# Patient Record
Sex: FEMALE | Race: WHITE | NOT HISPANIC OR LATINO | Employment: UNEMPLOYED | URBAN - NONMETROPOLITAN AREA
[De-identification: names, ages, dates, MRNs, and addresses within clinical notes are randomized per-mention and may not be internally consistent; named-entity substitution may affect disease eponyms.]

---

## 2019-12-18 ENCOUNTER — APPOINTMENT (OUTPATIENT)
Dept: LAB | Facility: HOSPITAL | Age: 24
End: 2019-12-18

## 2019-12-18 ENCOUNTER — INITIAL PRENATAL (OUTPATIENT)
Dept: OBSTETRICS AND GYNECOLOGY | Facility: CLINIC | Age: 24
End: 2019-12-18

## 2019-12-18 VITALS
DIASTOLIC BLOOD PRESSURE: 78 MMHG | WEIGHT: 169.2 LBS | HEIGHT: 62 IN | BODY MASS INDEX: 31.14 KG/M2 | SYSTOLIC BLOOD PRESSURE: 115 MMHG

## 2019-12-18 DIAGNOSIS — Z32.01 POSITIVE PREGNANCY TEST: ICD-10-CM

## 2019-12-18 DIAGNOSIS — Z12.4 CERVICAL CANCER SCREENING: ICD-10-CM

## 2019-12-18 DIAGNOSIS — Z86.32 HISTORY OF GESTATIONAL DIABETES IN PRIOR PREGNANCY, CURRENTLY PREGNANT IN FIRST TRIMESTER: ICD-10-CM

## 2019-12-18 DIAGNOSIS — Z64.1 MULTIGRAVIDA: ICD-10-CM

## 2019-12-18 DIAGNOSIS — O09.291 HISTORY OF GESTATIONAL DIABETES IN PRIOR PREGNANCY, CURRENTLY PREGNANT IN FIRST TRIMESTER: ICD-10-CM

## 2019-12-18 DIAGNOSIS — N91.2 AMENORRHEA: ICD-10-CM

## 2019-12-18 DIAGNOSIS — O09.291 HISTORY OF PRE-ECLAMPSIA IN PRIOR PREGNANCY, CURRENTLY PREGNANT IN FIRST TRIMESTER: ICD-10-CM

## 2019-12-18 DIAGNOSIS — Z32.01 PREGNANCY EXAMINATION OR TEST, POSITIVE RESULT: Primary | ICD-10-CM

## 2019-12-18 DIAGNOSIS — O09.291 HISTORY OF POSTPARTUM HEMORRHAGE, CURRENTLY PREGNANT, FIRST TRIMESTER: ICD-10-CM

## 2019-12-18 LAB
ABO GROUP BLD: NORMAL
ALBUMIN SERPL-MCNC: 4.2 G/DL (ref 3.5–5.2)
ALBUMIN/GLOB SERPL: 1.5 G/DL
ALP SERPL-CCNC: 81 U/L (ref 39–117)
ALT SERPL W P-5'-P-CCNC: 36 U/L (ref 1–33)
AMPHET+METHAMPHET UR QL: NEGATIVE
AMPHETAMINES UR QL: NEGATIVE
ANION GAP SERPL CALCULATED.3IONS-SCNC: 15 MMOL/L (ref 5–15)
AST SERPL-CCNC: 23 U/L (ref 1–32)
B-HCG UR QL: POSITIVE
BACTERIA UR QL AUTO: ABNORMAL /HPF
BARBITURATES UR QL SCN: NEGATIVE
BASOPHILS # BLD AUTO: 0.04 10*3/MM3 (ref 0–0.2)
BASOPHILS NFR BLD AUTO: 0.6 % (ref 0–1.5)
BENZODIAZ UR QL SCN: NEGATIVE
BILIRUB SERPL-MCNC: 0.4 MG/DL (ref 0.2–1.2)
BILIRUB UR QL STRIP: NEGATIVE
BLD GP AB SCN SERPL QL: NEGATIVE
BUN BLD-MCNC: 9 MG/DL (ref 6–20)
BUN/CREAT SERPL: 14.5 (ref 7–25)
BUPRENORPHINE SERPL-MCNC: NEGATIVE NG/ML
CALCIUM SPEC-SCNC: 9.1 MG/DL (ref 8.6–10.5)
CANNABINOIDS SERPL QL: NEGATIVE
CHLORIDE SERPL-SCNC: 100 MMOL/L (ref 98–107)
CLARITY UR: ABNORMAL
CO2 SERPL-SCNC: 22 MMOL/L (ref 22–29)
COCAINE UR QL: NEGATIVE
COLOR UR: ABNORMAL
CREAT BLD-MCNC: 0.62 MG/DL (ref 0.57–1)
DEPRECATED RDW RBC AUTO: 41.2 FL (ref 37–54)
EOSINOPHIL # BLD AUTO: 0.13 10*3/MM3 (ref 0–0.4)
EOSINOPHIL NFR BLD AUTO: 2 % (ref 0.3–6.2)
ERYTHROCYTE [DISTWIDTH] IN BLOOD BY AUTOMATED COUNT: 13.7 % (ref 12.3–15.4)
GFR SERPL CREATININE-BSD FRML MDRD: 116 ML/MIN/1.73
GLOBULIN UR ELPH-MCNC: 2.8 GM/DL
GLUCOSE BLD-MCNC: 92 MG/DL (ref 65–99)
GLUCOSE UR STRIP-MCNC: NEGATIVE MG/DL
HBV SURFACE AG SERPL QL IA: NORMAL
HCT VFR BLD AUTO: 37.1 % (ref 34–46.6)
HCV AB SER DONR QL: NORMAL
HGB BLD-MCNC: 12.9 G/DL (ref 12–15.9)
HGB UR QL STRIP.AUTO: NEGATIVE
HIV1+2 AB SER QL: NORMAL
HYALINE CASTS UR QL AUTO: ABNORMAL /LPF
IMM GRANULOCYTES # BLD AUTO: 0.01 10*3/MM3 (ref 0–0.05)
IMM GRANULOCYTES NFR BLD AUTO: 0.2 % (ref 0–0.5)
INTERNAL NEGATIVE CONTROL: NEGATIVE
INTERNAL POSITIVE CONTROL: POSITIVE
KETONES UR QL STRIP: ABNORMAL
LEUKOCYTE ESTERASE UR QL STRIP.AUTO: NEGATIVE
LYMPHOCYTES # BLD AUTO: 1.48 10*3/MM3 (ref 0.7–3.1)
LYMPHOCYTES NFR BLD AUTO: 23 % (ref 19.6–45.3)
Lab: ABNORMAL
Lab: NORMAL
MCH RBC QN AUTO: 29 PG (ref 26.6–33)
MCHC RBC AUTO-ENTMCNC: 34.8 G/DL (ref 31.5–35.7)
MCV RBC AUTO: 83.4 FL (ref 79–97)
METHADONE UR QL SCN: NEGATIVE
MONOCYTES # BLD AUTO: 0.37 10*3/MM3 (ref 0.1–0.9)
MONOCYTES NFR BLD AUTO: 5.8 % (ref 5–12)
NEUTROPHILS # BLD AUTO: 4.4 10*3/MM3 (ref 1.7–7)
NEUTROPHILS NFR BLD AUTO: 68.4 % (ref 42.7–76)
NITRITE UR QL STRIP: NEGATIVE
NRBC BLD AUTO-RTO: 0 /100 WBC (ref 0–0.2)
OPIATES UR QL: NEGATIVE
OXYCODONE UR QL SCN: NEGATIVE
PCP UR QL SCN: NEGATIVE
PH UR STRIP.AUTO: 6 [PH] (ref 5–8)
PLATELET # BLD AUTO: 385 10*3/MM3 (ref 140–450)
PMV BLD AUTO: 10.7 FL (ref 6–12)
POTASSIUM BLD-SCNC: 3.8 MMOL/L (ref 3.5–5.2)
PROPOXYPH UR QL: NEGATIVE
PROT SERPL-MCNC: 7 G/DL (ref 6–8.5)
PROT UR QL STRIP: ABNORMAL
RBC # BLD AUTO: 4.45 10*6/MM3 (ref 3.77–5.28)
RBC # UR: ABNORMAL /HPF
REF LAB TEST METHOD: ABNORMAL
RH BLD: POSITIVE
SODIUM BLD-SCNC: 137 MMOL/L (ref 136–145)
SP GR UR STRIP: >=1.03 (ref 1–1.03)
SQUAMOUS #/AREA URNS HPF: ABNORMAL /HPF
TRICYCLICS UR QL SCN: NEGATIVE
UROBILINOGEN UR QL STRIP: ABNORMAL
WBC NRBC COR # BLD: 6.43 10*3/MM3 (ref 3.4–10.8)
WBC UR QL AUTO: ABNORMAL /HPF

## 2019-12-18 PROCEDURE — 87624 HPV HI-RISK TYP POOLED RSLT: CPT | Performed by: NURSE PRACTITIONER

## 2019-12-18 PROCEDURE — 80307 DRUG TEST PRSMV CHEM ANLYZR: CPT | Performed by: NURSE PRACTITIONER

## 2019-12-18 PROCEDURE — 36415 COLL VENOUS BLD VENIPUNCTURE: CPT | Performed by: NURSE PRACTITIONER

## 2019-12-18 PROCEDURE — 81025 URINE PREGNANCY TEST: CPT | Performed by: NURSE PRACTITIONER

## 2019-12-18 PROCEDURE — 0501F PRENATAL FLOW SHEET: CPT | Performed by: NURSE PRACTITIONER

## 2019-12-18 PROCEDURE — 88141 CYTOPATH C/V INTERPRET: CPT | Performed by: PATHOLOGY

## 2019-12-18 PROCEDURE — 80081 OBSTETRIC PANEL INC HIV TSTG: CPT | Performed by: NURSE PRACTITIONER

## 2019-12-18 PROCEDURE — 81001 URINALYSIS AUTO W/SCOPE: CPT | Performed by: NURSE PRACTITIONER

## 2019-12-18 PROCEDURE — 87086 URINE CULTURE/COLONY COUNT: CPT | Performed by: NURSE PRACTITIONER

## 2019-12-18 PROCEDURE — 86803 HEPATITIS C AB TEST: CPT | Performed by: NURSE PRACTITIONER

## 2019-12-18 PROCEDURE — 88142 CYTOPATH C/V THIN LAYER: CPT | Performed by: NURSE PRACTITIONER

## 2019-12-18 PROCEDURE — 80053 COMPREHEN METABOLIC PANEL: CPT | Performed by: NURSE PRACTITIONER

## 2019-12-18 RX ORDER — PRENATAL VIT NO.126/IRON/FOLIC 28MG-0.8MG
TABLET ORAL DAILY
COMMUNITY

## 2019-12-18 NOTE — PROGRESS NOTES
"CC: Initial Prenatal visit    Della Redmond is a 24 y.o.  presents to establish prenatal care.  She denies any current medical issues.  She was induced with both of her previous pregnancies for pre-eclampsia.  Soon after delivery of first child had post partum hemorrhage which required exploratory laporotomy in the OR.  Due to history of hemorrhage patient prefer primary  section.  Her second pregnancy was complicated by GDM and JUSTUS requiring Fe infusions.  She is adopted and familiar with much family history.  Patient declines NIPS.      Past Surgical History:   Procedure Laterality Date   • EXPLORATORY LAPAROTOMY      Hemorage after deliver     Social History     Socioeconomic History   • Marital status:      Spouse name: Not on file   • Number of children: Not on file   • Years of education: Not on file   • Highest education level: Not on file     OB History    Para Term  AB Living   3 2 1 1   2   SAB TAB Ectopic Molar Multiple Live Births             2      # Outcome Date GA Lbr Alexi/2nd Weight Sex Delivery Anes PTL Lv   3 Current            2 Term 14 37w0d  4309 g (9 lb 8 oz) F Vag-Spont EPI  LISETTE      Birth Comments: GDM, PreE   1  01/08/10 36w0d  2920 g (6 lb 7 oz) F Vag-Spont EPI  LISETTE      Birth Comments: IOL PreE, PP hemorrhage         /78   Ht 157.5 cm (62\")   Wt 76.7 kg (169 lb 3.2 oz)   LMP 10/15/2019   BMI 30.95 kg/m²        Fetal Heart Rate: +HHUS     ROS: Pt denies cramping, dysuria, or vaginal bleeding.      See NOB physical in episode, PE non-remarkable, pap smear collected     Problems (from 19 to present)     Problem Noted Resolved    History of pre-eclampsia in prior pregnancy, currently pregnant in first trimester 2019 by Amy Lomax APRN No          A/P: Della Redmond is a 24 y.o.  at Unknown.  First trimester teaching done  NOB labs done  - RTC in 2 weeks for CHARLY appt and dating scan or " sooner if needed      Diagnosis Plan   1. Pregnancy examination or test, positive result  OB Panel With HIV    Urinalysis With Microscopic - Urine, Clean Catch    Urine Culture - Urine, Urine, Clean Catch    Urine Drug Screen - Urine, Clean Catch    US Ob Transvaginal    RPR    CBC Auto Differential    Hepatitis B Surface Antigen    Rubella Antibody, IgG    OB Panel Type & Screen    HIV-1 / O / 2 Ag / Antibody 4th Generation    Hepatitis C Antibody    Urinalysis without microscopic (no culture) - Urine, Clean Catch    Urinalysis, Microscopic Only - Urine, Clean Catch    PREVIOUS HISTORY    PREVIOUS HISTORY   2. Amenorrhea  POC Pregnancy, Urine   3. Positive pregnancy test     4. Cervical cancer screening  Liquid-based Pap Smear, Screening   5. History of pre-eclampsia in prior pregnancy, currently pregnant in first trimester  Comprehensive Metabolic Panel    Protein, Urine, 24 Hour - Urine, Clean Catch   6. Multigravida     7. History of postpartum hemorrhage, currently pregnant, first trimester  Pt desires PCS   8. History of gestational diabetes in prior pregnancy, currently pregnant in first trimester  Glucose, Post 50 Gm Glucola       EDWIGE Erickson  12/18/2019  12:40 PM

## 2019-12-19 LAB
BACTERIA SPEC AEROBE CULT: NO GROWTH
RPR SER QL: NORMAL
RUBV IGG SERPL IA-ACNC: NORMAL

## 2019-12-20 LAB
GEN CATEG CVX/VAG CYTO-IMP: ABNORMAL
LAB AP CASE REPORT: ABNORMAL
LAB AP GYN ADDITIONAL INFORMATION: ABNORMAL
PATH INTERP SPEC-IMP: ABNORMAL
STAT OF ADQ CVX/VAG CYTO-IMP: ABNORMAL

## 2019-12-27 LAB — HPV I/H RISK 4 DNA CVX QL PROBE+SIG AMP: NEGATIVE

## 2020-01-03 ENCOUNTER — ROUTINE PRENATAL (OUTPATIENT)
Dept: OBSTETRICS AND GYNECOLOGY | Facility: CLINIC | Age: 25
End: 2020-01-03

## 2020-01-03 ENCOUNTER — LAB (OUTPATIENT)
Dept: LAB | Facility: HOSPITAL | Age: 25
End: 2020-01-03

## 2020-01-03 VITALS — BODY MASS INDEX: 31.28 KG/M2 | DIASTOLIC BLOOD PRESSURE: 80 MMHG | WEIGHT: 171 LBS | SYSTOLIC BLOOD PRESSURE: 128 MMHG

## 2020-01-03 DIAGNOSIS — O09.291 HISTORY OF PRE-ECLAMPSIA IN PRIOR PREGNANCY, CURRENTLY PREGNANT IN FIRST TRIMESTER: ICD-10-CM

## 2020-01-03 DIAGNOSIS — Z3A.11 11 WEEKS GESTATION OF PREGNANCY: Primary | ICD-10-CM

## 2020-01-03 DIAGNOSIS — O09.291 HISTORY OF GESTATIONAL DIABETES IN PRIOR PREGNANCY, CURRENTLY PREGNANT IN FIRST TRIMESTER: ICD-10-CM

## 2020-01-03 DIAGNOSIS — Z64.1 MULTIGRAVIDA: ICD-10-CM

## 2020-01-03 DIAGNOSIS — O09.291 HISTORY OF POSTPARTUM HEMORRHAGE, CURRENTLY PREGNANT, FIRST TRIMESTER: ICD-10-CM

## 2020-01-03 DIAGNOSIS — Z86.32 HISTORY OF GESTATIONAL DIABETES IN PRIOR PREGNANCY, CURRENTLY PREGNANT IN FIRST TRIMESTER: ICD-10-CM

## 2020-01-03 LAB
COLLECT DURATION TIME UR: 24 HRS
PROT 24H UR-MRATE: 33.3 MG/24HOURS (ref 0–150)
PROT UR-MCNC: 7 MG/DL
SPECIMEN VOL 24H UR: 475 ML

## 2020-01-03 PROCEDURE — 0502F SUBSEQUENT PRENATAL CARE: CPT | Performed by: NURSE PRACTITIONER

## 2020-01-03 PROCEDURE — 84156 ASSAY OF PROTEIN URINE: CPT

## 2020-01-03 PROCEDURE — 81050 URINALYSIS VOLUME MEASURE: CPT

## 2020-01-03 NOTE — PROGRESS NOTES
CC: Prenatal visit    Della Redmond is a 24 y.o.  at 11w3d.  Doing well.  No complaints.  Denies cramping, dysuria, or VB.      /80   Wt 77.6 kg (171 lb)   LMP 10/15/2019   BMI 31.28 kg/m²        Fetal Heart Rate: 160 us     Reviewed prelim scan report with pt- single live intrauterine pregnancy, CRL c/w LMP, CL 3.7 cm, uterus wnl, bilateral ovaries wnl     Problems (from 19 to present)     Problem Noted Resolved    History of pre-eclampsia in prior pregnancy, currently pregnant in first trimester 2019 by Amy Lomax APRN No          A/P: Della Redmond is a 24 y.o.  at 11w3d.  - RTC in 4 weeks for CHARLY appt      Diagnosis Plan   1. 11 weeks gestation of pregnancy     2. History of pre-eclampsia in prior pregnancy, currently pregnant in first trimester     3. History of gestational diabetes in prior pregnancy, currently pregnant in first trimester     4. Multigravida     5. History of postpartum hemorrhage, currently pregnant, first trimester         EDWIGE Erickson  1/3/2020  12:14 PM

## 2020-01-14 ENCOUNTER — LAB (OUTPATIENT)
Dept: LAB | Facility: HOSPITAL | Age: 25
End: 2020-01-14

## 2020-01-14 DIAGNOSIS — Z36.87 UNSURE OF LMP (LAST MENSTRUAL PERIOD) AS REASON FOR ULTRASOUND SCAN: Primary | ICD-10-CM

## 2020-01-14 DIAGNOSIS — Z86.32 HISTORY OF GESTATIONAL DIABETES IN PRIOR PREGNANCY, CURRENTLY PREGNANT IN FIRST TRIMESTER: ICD-10-CM

## 2020-01-14 DIAGNOSIS — Z3A.11 11 WEEKS GESTATION OF PREGNANCY: ICD-10-CM

## 2020-01-14 DIAGNOSIS — O09.291 HISTORY OF GESTATIONAL DIABETES IN PRIOR PREGNANCY, CURRENTLY PREGNANT IN FIRST TRIMESTER: ICD-10-CM

## 2020-01-14 LAB — GLUCOSE 1H P 100 G GLC PO SERPL-MCNC: 79 MG/DL (ref 60–140)

## 2020-01-14 PROCEDURE — 82950 GLUCOSE TEST: CPT

## 2020-01-14 PROCEDURE — 36415 COLL VENOUS BLD VENIPUNCTURE: CPT

## 2020-01-15 DIAGNOSIS — Z36.87 UNSURE OF LMP (LAST MENSTRUAL PERIOD) AS REASON FOR ULTRASOUND SCAN: ICD-10-CM

## 2020-01-15 DIAGNOSIS — Z3A.11 11 WEEKS GESTATION OF PREGNANCY: ICD-10-CM

## 2020-01-23 ENCOUNTER — ROUTINE PRENATAL (OUTPATIENT)
Dept: OBSTETRICS AND GYNECOLOGY | Facility: CLINIC | Age: 25
End: 2020-01-23

## 2020-01-23 VITALS — DIASTOLIC BLOOD PRESSURE: 76 MMHG | WEIGHT: 170.6 LBS | BODY MASS INDEX: 31.2 KG/M2 | SYSTOLIC BLOOD PRESSURE: 118 MMHG

## 2020-01-23 DIAGNOSIS — Z87.59 HISTORY OF POSTPARTUM HEMORRHAGE: ICD-10-CM

## 2020-01-23 DIAGNOSIS — Z87.51 HISTORY OF PRETERM DELIVERY: ICD-10-CM

## 2020-01-23 DIAGNOSIS — O09.299 HISTORY OF GESTATIONAL DIABETES IN PRIOR PREGNANCY, CURRENTLY PREGNANT: ICD-10-CM

## 2020-01-23 DIAGNOSIS — O09.293 HISTORY OF MACROSOMIA IN INFANT IN PRIOR PREGNANCY, CURRENTLY PREGNANT IN THIRD TRIMESTER: ICD-10-CM

## 2020-01-23 DIAGNOSIS — O09.291 HISTORY OF PRE-ECLAMPSIA IN PRIOR PREGNANCY, CURRENTLY PREGNANT IN FIRST TRIMESTER: ICD-10-CM

## 2020-01-23 DIAGNOSIS — Z36.3 ANTENATAL SCREENING FOR MALFORMATION USING ULTRASONICS: ICD-10-CM

## 2020-01-23 DIAGNOSIS — O09.899 RUBELLA NON-IMMUNE STATUS, ANTEPARTUM: ICD-10-CM

## 2020-01-23 DIAGNOSIS — Z28.39 RUBELLA NON-IMMUNE STATUS, ANTEPARTUM: ICD-10-CM

## 2020-01-23 DIAGNOSIS — Z3A.14 14 WEEKS GESTATION OF PREGNANCY: ICD-10-CM

## 2020-01-23 DIAGNOSIS — Z86.32 HISTORY OF GESTATIONAL DIABETES IN PRIOR PREGNANCY, CURRENTLY PREGNANT: ICD-10-CM

## 2020-01-23 DIAGNOSIS — O09.92 SUPERVISION OF HIGH RISK PREGNANCY IN SECOND TRIMESTER: Primary | ICD-10-CM

## 2020-01-23 PROBLEM — O99.210 OBESITY IN PREGNANCY, ANTEPARTUM: Status: ACTIVE | Noted: 2020-01-23

## 2020-01-23 PROCEDURE — 0502F SUBSEQUENT PRENATAL CARE: CPT | Performed by: OBSTETRICS & GYNECOLOGY

## 2020-01-23 NOTE — PROGRESS NOTES
CC: Prenatal visit    Della Redmond is a 24 y.o.  at 14w2d.  Doing well.  No complaints.  Denies contractions, LOF, or VB.  She reports that in both of her deliveries, she hemorrhaged requiring blood transfusions both times.  She states that during the 1st delivery, she actually had to have surgery to stop her bleeding.  She does not want to labor with this pregnancy and wants to have a PLTCS to avoid hemorrhage and that if she needs additional surgery, she is already in surgery.  She would like her tubes tied as well.    /76   Wt 77.4 kg (170 lb 9.6 oz)   LMP 10/15/2019   BMI 31.20 kg/m²   Fetal Heart Rate: 145     Problems (from 19 to present)     Problem Noted Resolved    Rubella non-immune status, antepartum 2020 by Opal Dietz MD No    Overview Signed 2020  8:04 AM by Opal Dietz MD     Rubella equivocal   Needs MMR PP         History of  delivery 2020 by Opal Dietz MD No    Overview Signed 2020  8:04 AM by Opal Dietz MD     Delivery at 36 weeks         History of macrosomia in infant in prior pregnancy, currently pregnant in third trimester 2020 by Opal Dietz MD No    Overview Signed 2020  8:05 AM by Opal Dietz MD      9#8oz         Obesity in pregnancy, antepartum 2020 by Opal Dietz MD No    Overview Signed 2020 11:45 AM by Opal Dietz MD     Class I obesity, PG BMI 30.2  Early glucola WNL         History of pre-eclampsia in prior pregnancy, currently pregnant in first trimester 2019 by Amy Lomax APRN No    Overview Signed 2020  8:05 AM by Opal Dietz MD     Baseline 24h urine protein WNL         Supervision of high risk pregnancy in second trimester 2019 by Amy Lomax APRN No    Overview Signed 2020  8:03 AM  by Opal Dietz MD     O pos / Rubella equiv / GBS unk  Dating: LMP = 1TUS (11wk)  Genetics: Declined  Tdap: 28wk  Flu: Needs  Anatomy: 20wk  1h Glucola: 28wk  H&H/Plts: 28wk  Lab Results   Component Value Date    HGB 12.9 2019    HCT 37.1 2019     2019   Breast vs Bottle/BC undecided         History of postpartum hemorrhage 2019 by Amy Lomax APRN No    History of gestational diabetes in prior pregnancy, currently pregnant 2019 by Amy Lomax APRN No    Overview Signed 2020  8:02 AM by Opal Dietz MD     Early glucola WNL             A/P: Della Redmond is a 24 y.o.  at 14w2d.  - RTC in 4 weeks  - Anatomy US at next visit  - I counseled her that a  does put her at risk for higher blood loss than a vaginal delivery.  I counseled her about the risks associated with  including injury to surrounding organs, bleeding, infection, scarring.  She voices understanding and wishes to proceed with  for delivery.     Diagnosis Plan   1. Supervision of high risk pregnancy in second trimester     2. Rubella non-immune status, antepartum     3. History of  delivery     4. History of macrosomia in infant in prior pregnancy, currently pregnant in third trimester     5. History of pre-eclampsia in prior pregnancy, currently pregnant in first trimester     6. History of postpartum hemorrhage     7. History of gestational diabetes in prior pregnancy, currently pregnant     8. 14 weeks gestation of pregnancy     9.  screening for malformation using ultrasonics  US Ob 14 + Weeks Single or First Gestation     Opal Dietz MD  2020  11:45 AM

## 2020-02-21 ENCOUNTER — ROUTINE PRENATAL (OUTPATIENT)
Dept: OBSTETRICS AND GYNECOLOGY | Facility: CLINIC | Age: 25
End: 2020-02-21

## 2020-02-21 VITALS — SYSTOLIC BLOOD PRESSURE: 118 MMHG | BODY MASS INDEX: 30.91 KG/M2 | WEIGHT: 169 LBS | DIASTOLIC BLOOD PRESSURE: 74 MMHG

## 2020-02-21 DIAGNOSIS — O09.293 HISTORY OF MACROSOMIA IN INFANT IN PRIOR PREGNANCY, CURRENTLY PREGNANT IN THIRD TRIMESTER: ICD-10-CM

## 2020-02-21 DIAGNOSIS — Z86.32 HISTORY OF GESTATIONAL DIABETES IN PRIOR PREGNANCY, CURRENTLY PREGNANT: ICD-10-CM

## 2020-02-21 DIAGNOSIS — O09.92 SUPERVISION OF HIGH RISK PREGNANCY IN SECOND TRIMESTER: Primary | ICD-10-CM

## 2020-02-21 DIAGNOSIS — O99.210 OBESITY IN PREGNANCY, ANTEPARTUM: ICD-10-CM

## 2020-02-21 DIAGNOSIS — Z87.59 HISTORY OF POSTPARTUM HEMORRHAGE: ICD-10-CM

## 2020-02-21 DIAGNOSIS — O09.899 RUBELLA NON-IMMUNE STATUS, ANTEPARTUM: ICD-10-CM

## 2020-02-21 DIAGNOSIS — Z28.39 RUBELLA NON-IMMUNE STATUS, ANTEPARTUM: ICD-10-CM

## 2020-02-21 DIAGNOSIS — IMO0002 EVALUATE ANATOMY NOT SEEN ON PRIOR SONOGRAM: ICD-10-CM

## 2020-02-21 DIAGNOSIS — O09.299 HISTORY OF GESTATIONAL DIABETES IN PRIOR PREGNANCY, CURRENTLY PREGNANT: ICD-10-CM

## 2020-02-21 DIAGNOSIS — Z3A.18 18 WEEKS GESTATION OF PREGNANCY: ICD-10-CM

## 2020-02-21 DIAGNOSIS — Z87.51 HISTORY OF PRETERM DELIVERY: ICD-10-CM

## 2020-02-21 DIAGNOSIS — O09.291 HISTORY OF PRE-ECLAMPSIA IN PRIOR PREGNANCY, CURRENTLY PREGNANT IN FIRST TRIMESTER: ICD-10-CM

## 2020-02-21 PROCEDURE — 0502F SUBSEQUENT PRENATAL CARE: CPT | Performed by: OBSTETRICS & GYNECOLOGY

## 2020-02-21 NOTE — PROGRESS NOTES
CC: Prenatal visit    Della Redmond is a 24 y.o.  at 18w3d.  Doing well.  No complaints.  Denies contractions, LOF, or VB.    /74   Wt 76.7 kg (169 lb)   LMP 10/15/2019   BMI 30.91 kg/m²   Fetal Heart Rate: 153     Prelim US- EFW 256g, ALEC WNL, transverse spine up w/ head right, placenta posterior w/o previa, anatomy WNL w/ subopt views, cervix 3.93 cm, GIRL!     Problems (from 19 to present)     Problem Noted Resolved    Rubella non-immune status, antepartum 2020 by Opal Dietz MD No    Overview Signed 2020  8:04 AM by Opal Dietz MD     Rubella equivocal   Needs MMR PP         History of  delivery 2020 by Opal Dietz MD No    Overview Signed 2020  8:04 AM by Opal Dietz MD     Delivery at 36 weeks         History of macrosomia in infant in prior pregnancy, currently pregnant in third trimester 2020 by Opal Dietz MD No    Overview Signed 2020  8:05 AM by Opal Dietz MD      9#8oz         Obesity in pregnancy, antepartum 2020 by Opal Dietz MD No    Overview Signed 2020 11:45 AM by Opal Dietz MD     Class I obesity, PG BMI 30.2  Early glucola WNL         History of pre-eclampsia in prior pregnancy, currently pregnant in first trimester 2019 by Amy Lomax APRN No    Overview Addendum 2020  7:56 AM by Opal Dietz MD     1st pregnancy- 37 weeks, preE w/ severe features  Baseline 24h urine protein WNL         Supervision of high risk pregnancy in second trimester 2019 by Amy Lomax APRN No    Overview Addendum 2020 10:04 AM by Opal Dietz MD     O pos / Rubella equiv / GBS unk  Dating: LMP = 1TUS (11wk)  Genetics: Declined  Tdap: 28wk  Flu: Declines  Anatomy: WNL  1h Glucola: 28wk  H&H/Plts: 28wk  Lab  Results   Component Value Date    HGB 12.9 2019    HCT 37.1 2019     2019   Breast vs Bottle/BTL         History of postpartum hemorrhage 2019 by Amy Lomax APRN No    Overview Addendum 2020  7:53 AM by Opal Dietz MD     Required blood transfusion x2   (1st pregnancy)- prolonged 3rd stage, D&C -> ex lap, B-Glez suture; ICU admission, received 5u pRBCs  Desires PLTCS and BTL; counseled         History of gestational diabetes in prior pregnancy, currently pregnant 2019 by Amy Lomax APRN No    Overview Signed 2020  8:02 AM by Opal Dietz MD     Early glucola WNL             A/P: Della Redmond is a 24 y.o.  at 18w3d.  - RTC in 4 weeks  - Repeat US in 4 weeks for missed anatomy     Diagnosis Plan   1. Supervision of high risk pregnancy in second trimester     2. Rubella non-immune status, antepartum     3. History of  delivery     4. History of macrosomia in infant in prior pregnancy, currently pregnant in third trimester     5. Obesity in pregnancy, antepartum     6. History of pre-eclampsia in prior pregnancy, currently pregnant in first trimester     7. History of postpartum hemorrhage     8. History of gestational diabetes in prior pregnancy, currently pregnant     9. 18 weeks gestation of pregnancy     10. Evaluate anatomy not seen on prior sonogram  US Ob 14 + Weeks Single or First Gestation     Opal Dietz MD  2020  10:04 AM

## 2020-02-26 DIAGNOSIS — Z36.3 ANTENATAL SCREENING FOR MALFORMATION USING ULTRASONICS: ICD-10-CM

## 2020-03-20 ENCOUNTER — ROUTINE PRENATAL (OUTPATIENT)
Dept: OBSTETRICS AND GYNECOLOGY | Facility: CLINIC | Age: 25
End: 2020-03-20

## 2020-03-20 VITALS — BODY MASS INDEX: 33.29 KG/M2 | DIASTOLIC BLOOD PRESSURE: 72 MMHG | WEIGHT: 182 LBS | SYSTOLIC BLOOD PRESSURE: 104 MMHG

## 2020-03-20 DIAGNOSIS — O09.899 RUBELLA NON-IMMUNE STATUS, ANTEPARTUM: ICD-10-CM

## 2020-03-20 DIAGNOSIS — Z28.39 RUBELLA NON-IMMUNE STATUS, ANTEPARTUM: ICD-10-CM

## 2020-03-20 DIAGNOSIS — Z86.32 HISTORY OF GESTATIONAL DIABETES IN PRIOR PREGNANCY, CURRENTLY PREGNANT: ICD-10-CM

## 2020-03-20 DIAGNOSIS — O09.299 HISTORY OF GESTATIONAL DIABETES IN PRIOR PREGNANCY, CURRENTLY PREGNANT: ICD-10-CM

## 2020-03-20 DIAGNOSIS — O09.92 SUPERVISION OF HIGH RISK PREGNANCY IN SECOND TRIMESTER: Primary | ICD-10-CM

## 2020-03-20 DIAGNOSIS — Z87.59 HISTORY OF POSTPARTUM HEMORRHAGE: ICD-10-CM

## 2020-03-20 DIAGNOSIS — Z3A.22 22 WEEKS GESTATION OF PREGNANCY: ICD-10-CM

## 2020-03-20 DIAGNOSIS — O09.291 HISTORY OF PRE-ECLAMPSIA IN PRIOR PREGNANCY, CURRENTLY PREGNANT IN FIRST TRIMESTER: ICD-10-CM

## 2020-03-20 DIAGNOSIS — Z87.51 HISTORY OF PRETERM DELIVERY: ICD-10-CM

## 2020-03-20 DIAGNOSIS — O99.210 OBESITY IN PREGNANCY, ANTEPARTUM: ICD-10-CM

## 2020-03-20 DIAGNOSIS — O09.293 HISTORY OF MACROSOMIA IN INFANT IN PRIOR PREGNANCY, CURRENTLY PREGNANT IN THIRD TRIMESTER: ICD-10-CM

## 2020-03-20 PROCEDURE — 0502F SUBSEQUENT PRENATAL CARE: CPT | Performed by: OBSTETRICS & GYNECOLOGY

## 2020-03-20 NOTE — PROGRESS NOTES
CC: Prenatal visit    Della Redmnod is a 24 y.o.  at 22w3d.  Doing well.  No complaints.  Denies contractions, LOF, or VB.  Reports +FM.    /72   Wt 82.6 kg (182 lb)   LMP 10/15/2019   BMI 33.29 kg/m²   Fundal Height (cm): 22 cm  Fetal Heart Rate: 137    Prelim US- EFW 491g, ALEC WNL, cephalic, placenta posterior, subopt views visualized     Problems (from 19 to present)     Problem Noted Resolved    Rubella non-immune status, antepartum 2020 by Opal Dietz MD No    Overview Signed 2020  8:04 AM by Opal Dietz MD     Rubella equivocal   Needs MMR PP         History of  delivery 2020 by Opal Dietz MD No    Overview Signed 2020  8:04 AM by Opal Dietz MD     Delivery at 36 weeks         History of macrosomia in infant in prior pregnancy, currently pregnant in third trimester 2020 by Opal Dietz MD No    Overview Signed 2020  8:05 AM by Opal Dietz MD      9#8oz         Obesity in pregnancy, antepartum 2020 by Opal Dietz MD No    Overview Signed 2020 11:45 AM by Opal Dietz MD     Class I obesity, PG BMI 30.2  Early glucola WNL         History of pre-eclampsia in prior pregnancy, currently pregnant in first trimester 2019 by Amy Lomax APRN No    Overview Addendum 2020  7:56 AM by Opal Dietz MD     1st pregnancy- 37 weeks, preE w/ severe features  Baseline 24h urine protein WNL         Supervision of high risk pregnancy in second trimester 2019 by Amy Lomax APRN No    Overview Addendum 2020 10:04 AM by Opal Dietz MD     O pos / Rubella equiv / GBS unk  Dating: LMP = 1TUS (11wk)  Genetics: Declined  Tdap: 28wk  Flu: Declines  Anatomy: WNL  1h Glucola: 28wk  H&H/Plts: 28wk  Lab Results   Component Value  Date    HGB 12.9 2019    HCT 37.1 2019     2019   Breast vs Bottle/BTL         History of postpartum hemorrhage 2019 by Amy Lomax APRN No    Overview Addendum 2020  7:53 AM by Opal Dietz MD     Required blood transfusion x2   (1st pregnancy)- prolonged 3rd stage, D&C -> ex lap, B-Glez suture; ICU admission, received 5u pRBCs  Desires PLTCS and BTL; counseled         History of gestational diabetes in prior pregnancy, currently pregnant 2019 by Amy Lomax APRN No    Overview Signed 2020  8:02 AM by Opal Dietz MD     Early glucola WNL             A/P: Della Redmond is a 24 y.o.  at 22w3d.  - RTC in 4 weeks  - 3T labs at next visit  - Tdap at next visit  - Sign BTL forms at next visit  - Reviewed visitation policy  - Reviewed COVID-19 precautions     Diagnosis Plan   1. Supervision of high risk pregnancy in second trimester  Glucose, Post 50 Gm Glucola    CBC (No Diff)   2. Rubella non-immune status, antepartum     3. History of  delivery     4. History of macrosomia in infant in prior pregnancy, currently pregnant in third trimester     5. Obesity in pregnancy, antepartum     6. History of pre-eclampsia in prior pregnancy, currently pregnant in first trimester     7. History of postpartum hemorrhage     8. History of gestational diabetes in prior pregnancy, currently pregnant     9. 22 weeks gestation of pregnancy       Opal Dietz MD  3/20/2020  11:19

## 2020-03-27 DIAGNOSIS — IMO0002 EVALUATE ANATOMY NOT SEEN ON PRIOR SONOGRAM: ICD-10-CM

## 2020-04-17 ENCOUNTER — LAB (OUTPATIENT)
Dept: LAB | Facility: HOSPITAL | Age: 25
End: 2020-04-17

## 2020-04-17 ENCOUNTER — ROUTINE PRENATAL (OUTPATIENT)
Dept: OBSTETRICS AND GYNECOLOGY | Facility: CLINIC | Age: 25
End: 2020-04-17

## 2020-04-17 VITALS — WEIGHT: 187 LBS | DIASTOLIC BLOOD PRESSURE: 70 MMHG | SYSTOLIC BLOOD PRESSURE: 126 MMHG | BODY MASS INDEX: 34.2 KG/M2

## 2020-04-17 DIAGNOSIS — Z23 NEED FOR DIPHTHERIA-TETANUS-PERTUSSIS (TDAP) VACCINE: ICD-10-CM

## 2020-04-17 DIAGNOSIS — O99.210 OBESITY IN PREGNANCY, ANTEPARTUM: ICD-10-CM

## 2020-04-17 DIAGNOSIS — O09.92 SUPERVISION OF HIGH RISK PREGNANCY IN SECOND TRIMESTER: Primary | ICD-10-CM

## 2020-04-17 DIAGNOSIS — O09.299 HISTORY OF MACROSOMIA IN INFANT IN PRIOR PREGNANCY, CURRENTLY PREGNANT: ICD-10-CM

## 2020-04-17 DIAGNOSIS — Z87.59 HISTORY OF POSTPARTUM HEMORRHAGE: ICD-10-CM

## 2020-04-17 DIAGNOSIS — O09.92 SUPERVISION OF HIGH RISK PREGNANCY IN SECOND TRIMESTER: ICD-10-CM

## 2020-04-17 DIAGNOSIS — Z28.39 RUBELLA NON-IMMUNE STATUS, ANTEPARTUM: ICD-10-CM

## 2020-04-17 DIAGNOSIS — O09.899 RUBELLA NON-IMMUNE STATUS, ANTEPARTUM: ICD-10-CM

## 2020-04-17 DIAGNOSIS — Z86.32 HISTORY OF GESTATIONAL DIABETES IN PRIOR PREGNANCY, CURRENTLY PREGNANT: ICD-10-CM

## 2020-04-17 DIAGNOSIS — O09.299 HISTORY OF PRE-ECLAMPSIA IN PRIOR PREGNANCY, CURRENTLY PREGNANT: ICD-10-CM

## 2020-04-17 DIAGNOSIS — Z3A.26 26 WEEKS GESTATION OF PREGNANCY: ICD-10-CM

## 2020-04-17 DIAGNOSIS — O09.299 HISTORY OF GESTATIONAL DIABETES IN PRIOR PREGNANCY, CURRENTLY PREGNANT: ICD-10-CM

## 2020-04-17 DIAGNOSIS — Z87.51 HISTORY OF PRETERM DELIVERY: ICD-10-CM

## 2020-04-17 PROBLEM — O09.292 HISTORY OF PRE-ECLAMPSIA IN PRIOR PREGNANCY, CURRENTLY PREGNANT IN SECOND TRIMESTER: Status: ACTIVE | Noted: 2019-12-18

## 2020-04-17 LAB
DEPRECATED RDW RBC AUTO: 44.2 FL (ref 37–54)
ERYTHROCYTE [DISTWIDTH] IN BLOOD BY AUTOMATED COUNT: 14.2 % (ref 12.3–15.4)
GLUCOSE 1H P 100 G GLC PO SERPL-MCNC: 115 MG/DL (ref 60–140)
HCT VFR BLD AUTO: 32.5 % (ref 34–46.6)
HGB BLD-MCNC: 11 G/DL (ref 12–15.9)
MCH RBC QN AUTO: 29.2 PG (ref 26.6–33)
MCHC RBC AUTO-ENTMCNC: 33.8 G/DL (ref 31.5–35.7)
MCV RBC AUTO: 86.2 FL (ref 79–97)
PLATELET # BLD AUTO: 316 10*3/MM3 (ref 140–450)
PMV BLD AUTO: 11.8 FL (ref 6–12)
RBC # BLD AUTO: 3.77 10*6/MM3 (ref 3.77–5.28)
WBC NRBC COR # BLD: 7.47 10*3/MM3 (ref 3.4–10.8)

## 2020-04-17 PROCEDURE — 85027 COMPLETE CBC AUTOMATED: CPT

## 2020-04-17 PROCEDURE — 90715 TDAP VACCINE 7 YRS/> IM: CPT | Performed by: OBSTETRICS & GYNECOLOGY

## 2020-04-17 PROCEDURE — 90471 IMMUNIZATION ADMIN: CPT | Performed by: OBSTETRICS & GYNECOLOGY

## 2020-04-17 PROCEDURE — 82950 GLUCOSE TEST: CPT

## 2020-04-17 PROCEDURE — 0502F SUBSEQUENT PRENATAL CARE: CPT | Performed by: OBSTETRICS & GYNECOLOGY

## 2020-04-17 PROCEDURE — 36415 COLL VENOUS BLD VENIPUNCTURE: CPT

## 2020-04-17 NOTE — PROGRESS NOTES
CC: Prenatal visit    Della Redmond is a 24 y.o.  at 26w3d.  Doing well.  No complaints.  Denies contractions, LOF, or VB.  Reports good FM.    /70   Wt 84.8 kg (187 lb)   LMP 10/15/2019   BMI 34.20 kg/m²   Fundal Height (cm): 28 cm  Fetal Heart Rate: 157     Problems (from 19 to present)     Problem Noted Resolved    Rubella non-immune status, antepartum 2020 by Oapl Dietz MD No    Overview Signed 2020  8:04 AM by Opal Dietz MD     Rubella equivocal   Needs MMR PP         History of  delivery 2020 by Opal Dietz MD No    Overview Signed 2020  8:04 AM by Opal Dietz MD     Delivery at 36 weeks         History of macrosomia in infant in prior pregnancy, currently pregnant 2020 by Opal Dietz MD No    Overview Signed 2020  8:05 AM by Opal Dietz MD      9#8oz         Obesity in pregnancy, antepartum 2020 by Opal Dietz MD No    Overview Signed 2020 11:45 AM by Opal Dietz MD     Class I obesity, PG BMI 30.2  Early glucola WNL         History of pre-eclampsia in prior pregnancy, currently pregnant 2019 by Amy Lomax APRN No    Overview Addendum 2020  7:56 AM by Opal Dietz MD     1st pregnancy- 37 weeks, preE w/ severe features  Baseline 24h urine protein WNL         Supervision of high risk pregnancy in second trimester 2019 by Amy Lomax APRN No    Overview Addendum 2020  9:02 AM by Opal Dietz MD     O pos / Rubella equiv / GBS unk  Dating: LMP = 1TUS (11wk)  Genetics: Declined  Tdap: 28wk  Flu: Declines  Anatomy: WNL  1h Glucola: 28wk  H&H/Plts: 28wk  Lab Results   Component Value Date    HGB 12.9 2019    HCT 37.1 2019     2019   Breast vs Bottle/BTL (consents signed )          History of postpartum hemorrhage 2019 by Amy Lomax APRN No    Overview Addendum 2020  7:53 AM by Opal Dietz MD     Required blood transfusion x2   (1st pregnancy)- prolonged 3rd stage, D&C -> ex lap, B-Glez suture; ICU admission, received 5u pRBCs  Desires PLTCS and BTL; counseled         History of gestational diabetes in prior pregnancy, currently pregnant 2019 by Amy Lomax APRN No    Overview Signed 2020  8:02 AM by Opal Dietz MD     Early glucola WNL             A/P: Della Redmond is a 24 y.o.  at 26w3d.  - Offered 2 wk vs 4 wk, patient opted for 4 wk.  RTC in 4 weeks.  - 3T labs today  - Tdap today  - BTL consents signed today  - Reviewed COVID-19 visitation policy  - Reviewed COVID-19 precautions     Diagnosis Plan   1. Supervision of high risk pregnancy in second trimester     2. Rubella non-immune status, antepartum     3. History of  delivery     4. History of macrosomia in infant in prior pregnancy, currently pregnant     5. Obesity in pregnancy, antepartum     6. History of pre-eclampsia in prior pregnancy, currently pregnant     7. History of postpartum hemorrhage     8. History of gestational diabetes in prior pregnancy, currently pregnant     9. 26 weeks gestation of pregnancy     10. Need for diphtheria-tetanus-pertussis (Tdap) vaccine  Tdap Vaccine Greater Than or Equal To 6yo IM     Opal Dietz MD  2020  09:18

## 2020-04-20 ENCOUNTER — TELEPHONE (OUTPATIENT)
Dept: OBSTETRICS AND GYNECOLOGY | Facility: CLINIC | Age: 25
End: 2020-04-20

## 2020-04-20 NOTE — TELEPHONE ENCOUNTER
----- Message from Opal Ditez MD sent at 4/18/2020 10:02 AM CDT -----  Please let her know that she passed her glucola and she is not anemic.

## 2020-05-22 ENCOUNTER — ROUTINE PRENATAL (OUTPATIENT)
Dept: OBSTETRICS AND GYNECOLOGY | Facility: CLINIC | Age: 25
End: 2020-05-22

## 2020-05-22 ENCOUNTER — APPOINTMENT (OUTPATIENT)
Dept: LAB | Facility: HOSPITAL | Age: 25
End: 2020-05-22

## 2020-05-22 VITALS — WEIGHT: 197 LBS | BODY MASS INDEX: 36.03 KG/M2 | SYSTOLIC BLOOD PRESSURE: 112 MMHG | DIASTOLIC BLOOD PRESSURE: 76 MMHG

## 2020-05-22 DIAGNOSIS — O09.899 RUBELLA NON-IMMUNE STATUS, ANTEPARTUM: ICD-10-CM

## 2020-05-22 DIAGNOSIS — Z3A.31 31 WEEKS GESTATION OF PREGNANCY: ICD-10-CM

## 2020-05-22 DIAGNOSIS — O09.299 HISTORY OF PRE-ECLAMPSIA IN PRIOR PREGNANCY, CURRENTLY PREGNANT: ICD-10-CM

## 2020-05-22 DIAGNOSIS — Z28.39 RUBELLA NON-IMMUNE STATUS, ANTEPARTUM: ICD-10-CM

## 2020-05-22 DIAGNOSIS — Z86.32 HISTORY OF GESTATIONAL DIABETES IN PRIOR PREGNANCY, CURRENTLY PREGNANT: ICD-10-CM

## 2020-05-22 DIAGNOSIS — O09.299 HISTORY OF GESTATIONAL DIABETES IN PRIOR PREGNANCY, CURRENTLY PREGNANT: ICD-10-CM

## 2020-05-22 DIAGNOSIS — O09.93 SUPERVISION OF HIGH RISK PREGNANCY IN THIRD TRIMESTER: Primary | ICD-10-CM

## 2020-05-22 DIAGNOSIS — Z87.59 HISTORY OF POSTPARTUM HEMORRHAGE: ICD-10-CM

## 2020-05-22 DIAGNOSIS — O99.210 OBESITY IN PREGNANCY, ANTEPARTUM: ICD-10-CM

## 2020-05-22 DIAGNOSIS — Z87.51 HISTORY OF PRETERM DELIVERY: ICD-10-CM

## 2020-05-22 DIAGNOSIS — O09.299 HISTORY OF MACROSOMIA IN INFANT IN PRIOR PREGNANCY, CURRENTLY PREGNANT: ICD-10-CM

## 2020-05-22 PROCEDURE — 87491 CHLMYD TRACH DNA AMP PROBE: CPT | Performed by: OBSTETRICS & GYNECOLOGY

## 2020-05-22 PROCEDURE — 87661 TRICHOMONAS VAGINALIS AMPLIF: CPT | Performed by: OBSTETRICS & GYNECOLOGY

## 2020-05-22 PROCEDURE — 0502F SUBSEQUENT PRENATAL CARE: CPT | Performed by: OBSTETRICS & GYNECOLOGY

## 2020-05-22 PROCEDURE — 87591 N.GONORRHOEAE DNA AMP PROB: CPT | Performed by: OBSTETRICS & GYNECOLOGY

## 2020-05-23 LAB
C TRACH RRNA CVX QL NAA+PROBE: NEGATIVE
N GONORRHOEA RRNA SPEC QL NAA+PROBE: NEGATIVE
TRICHOMONAS VAGINALIS PCR: NEGATIVE

## 2020-05-26 ENCOUNTER — TELEPHONE (OUTPATIENT)
Dept: OBSTETRICS AND GYNECOLOGY | Facility: CLINIC | Age: 25
End: 2020-05-26

## 2020-05-26 NOTE — TELEPHONE ENCOUNTER
----- Message from Opal Dietz MD sent at 5/26/2020  6:36 AM CDT -----  Please let her know negative gonorrhea/chlamydia/trichomonas.

## 2020-05-26 NOTE — TELEPHONE ENCOUNTER
Patient returned call, let her know that negative/chlamydia/trichomonas was all negative. Patient verbalized understanding and did not have any additional questions or concerns at this time.

## 2020-06-04 ENCOUNTER — ROUTINE PRENATAL (OUTPATIENT)
Dept: OBSTETRICS AND GYNECOLOGY | Facility: CLINIC | Age: 25
End: 2020-06-04

## 2020-06-04 VITALS — SYSTOLIC BLOOD PRESSURE: 110 MMHG | WEIGHT: 199.6 LBS | DIASTOLIC BLOOD PRESSURE: 68 MMHG | BODY MASS INDEX: 36.51 KG/M2

## 2020-06-04 DIAGNOSIS — Z87.51 HISTORY OF PRETERM DELIVERY: ICD-10-CM

## 2020-06-04 DIAGNOSIS — Z86.32 HISTORY OF GESTATIONAL DIABETES IN PRIOR PREGNANCY, CURRENTLY PREGNANT: ICD-10-CM

## 2020-06-04 DIAGNOSIS — Z87.59 HISTORY OF POSTPARTUM HEMORRHAGE: ICD-10-CM

## 2020-06-04 DIAGNOSIS — Z28.39 RUBELLA NON-IMMUNE STATUS, ANTEPARTUM: ICD-10-CM

## 2020-06-04 DIAGNOSIS — Z30.09 UNWANTED FERTILITY: ICD-10-CM

## 2020-06-04 DIAGNOSIS — O09.899 RUBELLA NON-IMMUNE STATUS, ANTEPARTUM: ICD-10-CM

## 2020-06-04 DIAGNOSIS — O09.299 HISTORY OF GESTATIONAL DIABETES IN PRIOR PREGNANCY, CURRENTLY PREGNANT: ICD-10-CM

## 2020-06-04 DIAGNOSIS — O99.210 OBESITY IN PREGNANCY, ANTEPARTUM: ICD-10-CM

## 2020-06-04 DIAGNOSIS — O09.299 HISTORY OF MACROSOMIA IN INFANT IN PRIOR PREGNANCY, CURRENTLY PREGNANT: ICD-10-CM

## 2020-06-04 DIAGNOSIS — O09.299 HISTORY OF PRE-ECLAMPSIA IN PRIOR PREGNANCY, CURRENTLY PREGNANT: ICD-10-CM

## 2020-06-04 DIAGNOSIS — Z3A.33 33 WEEKS GESTATION OF PREGNANCY: ICD-10-CM

## 2020-06-04 DIAGNOSIS — O09.93 SUPERVISION OF HIGH RISK PREGNANCY IN THIRD TRIMESTER: Primary | ICD-10-CM

## 2020-06-04 PROCEDURE — 0502F SUBSEQUENT PRENATAL CARE: CPT | Performed by: OBSTETRICS & GYNECOLOGY

## 2020-06-04 NOTE — PROGRESS NOTES
CC: Prenatal visit    Della Redmond is a 24 y.o.  at 33w2d.  Doing well.  Denies contractions, LOF, or VB.  Reports good FM.    /68   Wt 90.5 kg (199 lb 9.6 oz)   LMP 10/15/2019   BMI 36.51 kg/m²   Fundal Height (cm): 34 cm  Fetal Heart Rate: 140     Problems (from 19 to present)     Problem Noted Resolved    Unwanted fertility 2020 by Opal Dietz MD No    Overview Signed 2020 10:36 AM by Opal Dietz MD     BTL consents signed          Rubella non-immune status, antepartum 2020 by Opal Dietz MD No    Overview Signed 2020  8:04 AM by Opal Dietz MD     Rubella equivocal   Needs MMR PP         History of  delivery 2020 by Opal Dietz MD No    Overview Signed 2020  8:04 AM by Opal Dietz MD     Delivery at 36 weeks         History of macrosomia in infant in prior pregnancy, currently pregnant 2020 by Opal Dietz MD No    Overview Signed 2020  8:05 AM by Opal Dietz MD      9#8oz         Obesity in pregnancy, antepartum 2020 by Opal Dietz MD No    Overview Signed 2020 11:45 AM by Opal Dietz MD     Class I obesity, PG BMI 30.2  Early glucola WNL         History of pre-eclampsia in prior pregnancy, currently pregnant 2019 by Amy Lomax APRN No    Overview Addendum 2020  7:56 AM by Opal Dietz MD     1st pregnancy- 37 weeks, preE w/ severe features  Baseline 24h urine protein WNL         Supervision of high risk pregnancy in third trimester 2019 by Amy Lomax APRN No    Overview Addendum 2020  9:36 AM by Opal Dietz MD     O pos / Rubella equiv / GBS unk  Dating: LMP = 1TUS (11wk)  Genetics: Declined  Tdap:   Flu: Declines  Anatomy: WNL  1h Glucola:  115  H&H/Plts:  Lab Results   Component Value Date    HGB 11.0 (L) 2020    HCT 32.5 (L) 2020     2020   Breast vs Bottle/BTL (consents signed )         History of postpartum hemorrhage 2019 by Amy Lomax APRN No    Overview Addendum 2020  7:53 AM by Opal Dietz MD     Required blood transfusion x2   (1st pregnancy)- prolonged 3rd stage, D&C -> ex lap, B-Glez suture; ICU admission, received 5u pRBCs  Desires PLTCS and BTL; counseled         History of gestational diabetes in prior pregnancy, currently pregnant 2019 by Amy Lomax APRN No    Overview Signed 2020  8:02 AM by Opal Dietz MD     Early glucola WNL             A/P: Della Redmond is a 24 y.o.  at 33w2d.  - RTC in 2 weeks  - GBS, BSUS at next visit  - Will schedule PLTCS and B/L salpingectomy at next visit (plan for )  - Reviewed COVID-19 visitation policy  - Reviewed COVID-19 precautions     Diagnosis Plan   1. Supervision of high risk pregnancy in third trimester     2. Rubella non-immune status, antepartum     3. History of  delivery     4. History of macrosomia in infant in prior pregnancy, currently pregnant     5. Obesity in pregnancy, antepartum     6. History of pre-eclampsia in prior pregnancy, currently pregnant     7. History of postpartum hemorrhage     8. History of gestational diabetes in prior pregnancy, currently pregnant     9. Unwanted fertility     10. 33 weeks gestation of pregnancy       Opal Dietz MD  2020  10:45

## 2020-06-18 ENCOUNTER — ROUTINE PRENATAL (OUTPATIENT)
Dept: OBSTETRICS AND GYNECOLOGY | Facility: CLINIC | Age: 25
End: 2020-06-18

## 2020-06-18 VITALS — SYSTOLIC BLOOD PRESSURE: 116 MMHG | DIASTOLIC BLOOD PRESSURE: 68 MMHG | BODY MASS INDEX: 37.57 KG/M2 | WEIGHT: 205.4 LBS

## 2020-06-18 DIAGNOSIS — O09.299 HISTORY OF PRE-ECLAMPSIA IN PRIOR PREGNANCY, CURRENTLY PREGNANT: ICD-10-CM

## 2020-06-18 DIAGNOSIS — O09.93 SUPERVISION OF HIGH RISK PREGNANCY IN THIRD TRIMESTER: Primary | ICD-10-CM

## 2020-06-18 DIAGNOSIS — O09.899 RUBELLA NON-IMMUNE STATUS, ANTEPARTUM: ICD-10-CM

## 2020-06-18 DIAGNOSIS — O26.849 UTERINE SIZE DATE DISCREPANCY PREGNANCY: ICD-10-CM

## 2020-06-18 DIAGNOSIS — Z28.39 RUBELLA NON-IMMUNE STATUS, ANTEPARTUM: ICD-10-CM

## 2020-06-18 DIAGNOSIS — O09.299 HISTORY OF GESTATIONAL DIABETES IN PRIOR PREGNANCY, CURRENTLY PREGNANT: ICD-10-CM

## 2020-06-18 DIAGNOSIS — O09.299 HISTORY OF MACROSOMIA IN INFANT IN PRIOR PREGNANCY, CURRENTLY PREGNANT: ICD-10-CM

## 2020-06-18 DIAGNOSIS — Z3A.35 35 WEEKS GESTATION OF PREGNANCY: ICD-10-CM

## 2020-06-18 DIAGNOSIS — Z87.59 HISTORY OF POSTPARTUM HEMORRHAGE: ICD-10-CM

## 2020-06-18 DIAGNOSIS — Z30.09 UNWANTED FERTILITY: ICD-10-CM

## 2020-06-18 DIAGNOSIS — O99.210 OBESITY IN PREGNANCY, ANTEPARTUM: ICD-10-CM

## 2020-06-18 DIAGNOSIS — Z87.51 HISTORY OF PRETERM DELIVERY: ICD-10-CM

## 2020-06-18 DIAGNOSIS — Z86.32 HISTORY OF GESTATIONAL DIABETES IN PRIOR PREGNANCY, CURRENTLY PREGNANT: ICD-10-CM

## 2020-06-18 PROCEDURE — 0502F SUBSEQUENT PRENATAL CARE: CPT | Performed by: OBSTETRICS & GYNECOLOGY

## 2020-06-18 PROCEDURE — 87653 STREP B DNA AMP PROBE: CPT | Performed by: OBSTETRICS & GYNECOLOGY

## 2020-06-18 RX ORDER — OXYTOCIN 10 [USP'U]/ML
650 INJECTION, SOLUTION INTRAMUSCULAR; INTRAVENOUS ONCE
Status: CANCELLED | OUTPATIENT
Start: 2020-06-18

## 2020-06-18 RX ORDER — TRISODIUM CITRATE DIHYDRATE AND CITRIC ACID MONOHYDRATE 500; 334 MG/5ML; MG/5ML
30 SOLUTION ORAL ONCE
Status: CANCELLED | OUTPATIENT
Start: 2020-06-18 | End: 2020-06-18

## 2020-06-18 RX ORDER — ONDANSETRON 4 MG/1
4 TABLET, FILM COATED ORAL EVERY 6 HOURS PRN
Status: CANCELLED | OUTPATIENT
Start: 2020-06-18

## 2020-06-18 RX ORDER — SODIUM CHLORIDE 0.9 % (FLUSH) 0.9 %
3-10 SYRINGE (ML) INJECTION AS NEEDED
Status: CANCELLED | OUTPATIENT
Start: 2020-06-18

## 2020-06-18 RX ORDER — SODIUM CHLORIDE 0.9 % (FLUSH) 0.9 %
3 SYRINGE (ML) INJECTION EVERY 12 HOURS SCHEDULED
Status: CANCELLED | OUTPATIENT
Start: 2020-06-18

## 2020-06-18 RX ORDER — ONDANSETRON 2 MG/ML
4 INJECTION INTRAMUSCULAR; INTRAVENOUS EVERY 6 HOURS PRN
Status: CANCELLED | OUTPATIENT
Start: 2020-06-18

## 2020-06-18 RX ORDER — LIDOCAINE HYDROCHLORIDE 10 MG/ML
5 INJECTION, SOLUTION EPIDURAL; INFILTRATION; INTRACAUDAL; PERINEURAL AS NEEDED
Status: CANCELLED | OUTPATIENT
Start: 2020-06-18

## 2020-06-18 RX ORDER — OXYTOCIN 10 [USP'U]/ML
85 INJECTION, SOLUTION INTRAMUSCULAR; INTRAVENOUS ONCE
Status: CANCELLED | OUTPATIENT
Start: 2020-06-18

## 2020-06-18 NOTE — PROGRESS NOTES
CC: Prenatal visit    Della Redmond is a 24 y.o.  at 35w2d.  Doing well.  Denies contractions, LOF, or VB.  Reports good FM.    /68   Wt 93.2 kg (205 lb 6.4 oz)   LMP 10/15/2019   BMI 37.57 kg/m²   SVE: 50-60/-3/soft/ant, vertex  Fundal Height (cm): 39 cm  Fetal Heart Rate: 142     Problems (from 19 to present)     Problem Noted Resolved    Unwanted fertility 2020 by Opal Dietz MD No    Overview Signed 2020 10:36 AM by Opal Dietz MD     BTL consents signed          Rubella non-immune status, antepartum 2020 by Opal Dietz MD No    Overview Signed 2020  8:04 AM by Opal Dietz MD     Rubella equivocal   Needs MMR PP         History of  delivery 2020 by Opal Dietz MD No    Overview Signed 2020  8:04 AM by Opal Dietz MD     Delivery at 36 weeks         History of macrosomia in infant in prior pregnancy, currently pregnant 2020 by Opal Dietz MD No    Overview Signed 2020  8:05 AM by Opal Dietz MD      9#8oz         Obesity in pregnancy, antepartum 2020 by Opal Dietz MD No    Overview Signed 2020 11:45 AM by Opal Dietz MD     Class I obesity, PG BMI 30.2  Early glucola WNL         History of pre-eclampsia in prior pregnancy, currently pregnant 2019 by Amy Lomax APRN No    Overview Addendum 2020  7:56 AM by Opal Dietz MD     1st pregnancy- 37 weeks, preE w/ severe features  Baseline 24h urine protein WNL         Supervision of high risk pregnancy in third trimester 2019 by Amy Lomax APRN No    Overview Addendum 2020  9:36 AM by Opal Dietz MD     O pos / Rubella equiv / GBS unk  Dating: LMP = 1TUS (11wk)  Genetics: Declined  Tdap:   Flu:  Declines  Anatomy: WNL  1h Glucola: 115  H&H/Plts:  Lab Results   Component Value Date    HGB 11.0 (L) 2020    HCT 32.5 (L) 2020     2020   Breast vs Bottle/BTL (consents signed )         History of postpartum hemorrhage 2019 by Amy Lomax APRN No    Overview Addendum 2020  7:53 AM by Opal Dietz MD     Required blood transfusion x2   (1st pregnancy)- prolonged 3rd stage, D&C -> ex lap, B-Glez suture; ICU admission, received 5u pRBCs  Desires PLTCS and BTL; counseled         History of gestational diabetes in prior pregnancy, currently pregnant 2019 by Amy Lomax APRN No    Overview Signed 2020  8:02 AM by Opal Dietz MD     Early glucola WNL             A/P: Della Redmond is a 24 y.o.  at 35w2d.  - RTC in 1 week   - GS next week due to S>D  - ERLTCS and B/L salpingectomy at 39w0d on   - Reviewed COVID-19 visitation policy  - Reviewed COVID-19 precautions     Diagnosis Plan   1. Supervision of high risk pregnancy in third trimester  Case Request    lidocaine PF 1% (XYLOCAINE) injection 5 mL    sodium chloride 0.9 % flush 3 mL    sodium chloride 0.9 % flush 3-10 mL    lactated ringers bolus 1,000 mL    Sod Citrate-Citric Acid (BICITRA) solution 30 mL    oxytocin (PITOCIN) injection    oxytocin (PITOCIN) injection    ondansetron (ZOFRAN) tablet 4 mg    ondansetron (ZOFRAN) injection 4 mg    ceFAZolin (ANCEF) 2 g in sodium chloride 0.9 % 100 mL IVPB    Case Request   2. Unwanted fertility     3. Rubella non-immune status, antepartum     4. History of  delivery     5. History of macrosomia in infant in prior pregnancy, currently pregnant     6. Obesity in pregnancy, antepartum     7. History of pre-eclampsia in prior pregnancy, currently pregnant     8. History of postpartum hemorrhage     9. History of gestational diabetes in prior pregnancy, currently pregnant     10. 35  weeks gestation of pregnancy  Group B Strep (Molecular) - Swab, Vaginal/Rectum   11. Uterine size date discrepancy pregnancy  US Ob Follow Up Transabdominal Approach     Opal Dietz MD  6/18/2020  09:53

## 2020-06-18 NOTE — H&P
HISTORY & PHYSICAL - Obstetrics  [facility]    Name: Della Redmond  MRN: [id number]  Location: Room/bed info not found  Date: 2020  CSN: [id number]      CHIEF COMPLAINT:  section    HISTORY OF PRESENT ILLNESS  Della Redmond is a 24 y.o.  at 35w2d gestational age by LMP = 1TUS suggesting Estimated Date of Delivery: 20.  The patient presents for RPN.  She has had 2 vaginal deliveries but both complicated by PPH.  In her first delivery, she required an exploratory laparotomy with B-Glez sutures.  She has been counseled extensively and desires PLTCS with permanent sterilization.  Today denies LOF, vaginal bleeding, or contractions.  Reports good FM.    Patient denies any chest pain, palpitations, headaches, lightheadedness, shortness of breath, cough, nausea, vomiting, diarrhea, constipation, fever, or chills.    ROS  Review of Systems   Constitutional: Negative.    HENT: Negative.    Eyes: Negative.    Respiratory: Negative.    Cardiovascular: Negative.    Gastrointestinal: Negative.    Endocrine: Negative.    Genitourinary: Negative.    Musculoskeletal: Negative.    Skin: Negative.    Allergic/Immunologic: Negative.    Neurological: Negative.    Hematological: Negative.    Psychiatric/Behavioral: Negative.      PRENATAL LAB RESULTS  Prenatal labs reviewed.    External Prenatal Results     Pregnancy Outside Results - Transcribed From Office Records - See Scanned Records For Details     Test Value Date Time    Hgb 11.0 g/dL 20 1000      12.9 g/dL 19 1043    Hct 32.5 % 20 1000      37.1 % 19 1043    ABO O  19 1043    Rh Positive  19 1043    Antibody Screen Negative  19 1043    Glucose Fasting GTT       Glucose Tolerance Test 1 hour       Glucose Tolerance Test 3 hour       Gonorrhea (discrete) Negative  20 0937    Chlamydia (discrete) Negative  20 0937    RPR Non-Reactive  19 1043    VDRL       Syphilis Antibody        Rubella Equivocal  19 1043    HBsAg Non-Reactive  19 1043    Herpes Simplex Virus PCR       Herpes Simplex VIrus Culture       HIV Non-Reactive  19 1043    Hep C RNA Quant PCR       Hep C Antibody Non-Reactive  19 1043    AFP       Group B Strep       GBS Susceptibility to Clindamycin       GBS Susceptibility to Erythromycin       Fetal Fibronectin Negative  14 0026    Genetic Testing, Maternal Blood             Drug Screening     Test Value Date Time    Urine Drug Screen       Amphetamine Screen Negative  19 1043    Barbiturate Screen Negative  19 1043    Benzodiazepine Screen Negative  19 1043    Methadone Screen Negative  19 1043    Phencyclidine Screen Negative  19 1043    Opiates Screen Negative  19 1043    THC Screen Negative  19 1043    Cocaine Screen       Propoxyphene Screen Negative  19 1043    Buprenorphine Screen Negative  19 1043    Methamphetamine Screen       Oxycodone Screen Negative  19 1043    Tricyclic Antidepressants Screen Negative  19 1043              PRENATAL RISK FACTORS   Problems (from 19 to present)     Problem Noted Resolved    Unwanted fertility 2020 by Opal Dietz MD No    Overview Signed 2020 10:36 AM by Opal Dietz MD     BTL consents signed          Rubella non-immune status, antepartum 2020 by Opal Dietz MD No    Overview Signed 2020  8:04 AM by Opal Dietz MD     Rubella equivocal   Needs MMR PP         History of  delivery 2020 by Opal Dietz MD No    Overview Signed 2020  8:04 AM by Opal Dietz MD     Delivery at 36 weeks         History of macrosomia in infant in prior pregnancy, currently pregnant 2020 by Opal Dietz MD No    Overview Signed 2020  8:05 AM by Opal Dietz MD       9#8oz         Obesity in pregnancy, antepartum 2020 by Opal Dietz MD No    Overview Signed 2020 11:45 AM by Opal Dietz MD     Class I obesity, PG BMI 30.2  Early glucola WNL         History of pre-eclampsia in prior pregnancy, currently pregnant 2019 by Amy Lomax APRN No    Overview Addendum 2020  7:56 AM by Opal Dietz MD     1st pregnancy- 37 weeks, preE w/ severe features  Baseline 24h urine protein WNL         Supervision of high risk pregnancy in third trimester 2019 by Amy Lomax APRN No    Overview Addendum 2020  9:36 AM by Opal Dietz MD     O pos / Rubella equiv / GBS unk  Dating: LMP = 1TUS (11wk)  Genetics: Declined  Tdap:   Flu: Declines  Anatomy: WNL  1h Glucola: 115  H&H/Plts:  Lab Results   Component Value Date    HGB 11.0 (L) 2020    HCT 32.5 (L) 2020     2020   Breast vs Bottle/BTL (consents signed )         History of postpartum hemorrhage 2019 by Amy Lomax APRN No    Overview Addendum 2020  7:53 AM by Opal Dietz MD     Required blood transfusion x2   (1st pregnancy)- prolonged 3rd stage, D&C -> ex lap, B-Glez suture; ICU admission, received 5u pRBCs  Desires PLTCS and BTL; counseled         History of gestational diabetes in prior pregnancy, currently pregnant 2019 by Amy Lomax APRN No    Overview Signed 2020  8:02 AM by Opal Dietz MD     Early glucola WNL             OB HISTORY  OB History    Para Term  AB Living   3 2 1 1   2   SAB TAB Ectopic Molar Multiple Live Births             2      # Outcome Date GA Lbr Alexi/2nd Weight Sex Delivery Anes PTL Lv   3 Current            2 Term 14 37w0d  4309 g (9 lb 8 oz) F Vag-Spont EPI  LISETTE      Birth Comments: GDM, PreE   1  01/08/10 36w0d  2920 g (6  lb 7 oz) F Vag-Spont EPI  LISETTE      Birth Comments: IOL PreE, PP hemorrhage     GYN HISTORY  Denies h/o sexually transmitted infections/pelvic inflammatory disease  Denies h/o gynecologic surgeries, including biopsies of the cervix    PAST MEDICAL HISTORY  Past Medical History:   Diagnosis Date   • Gestational diabetes    • Iron deficiency anemia during pregnancy    • Postpartum hemorrhage    • Pre-eclampsia      PAST SURGICAL HISTORY  Past Surgical History:   Procedure Laterality Date   • EXPLORATORY LAPAROTOMY      PPH- D&C, ex lap, B-christina suture     FAMILY HISTORY  History reviewed. No pertinent family history.  SOCIAL HISTORY  Social History     Socioeconomic History   • Marital status:      Spouse name: Not on file   • Number of children: Not on file   • Years of education: Not on file   • Highest education level: Not on file   Tobacco Use   • Smoking status: Never Smoker   • Smokeless tobacco: Never Used   Substance and Sexual Activity   • Alcohol use: Not Currently   • Drug use: Never   • Sexual activity: Yes     Partners: Male     ALLERGIES  No Known Allergies    HOME MEDICATIONS  Prior to Admission medications    Medication Sig Start Date End Date Taking? Authorizing Provider   Prenatal Vit-Fe Fumarate-FA (PRENATAL, CLASSIC, VITAMIN) 28-0.8 MG tablet tablet Take  by mouth Daily.    Provider, Historical, MD     PHYSICAL EXAM  /68   Wt 93.2 kg (205 lb 6.4 oz)   LMP 10/15/2019   BMI 37.57 kg/m²   General: No acute distress.  Well developed, well nourished.  Pleasant.  Heart: Regular rate and rhythm.  No murmurs, rubs, or gallops.  Lungs: Clear to auscultation bilaterally.  No wheezes, rales, or rhonchi.  Abdomen: Soft, nontender to palpation, enlarged by gravid uterus.  Extremities: Mild edema noted bilaterally.    IMPRESSION  Della Redmond is a 24 y.o.  at 35w2d, scheduled for PLTCS and B/L salpingectomy at 39 weeks,    PLAN  1.  IUP at 39w0d with h/o PPH, desires PTLCS  - Admit:  Labor and Delivery  - Attending: Dr. Opal Dietz  - Condition: Stable  - Vitals: per protocol  - Activity: ad shruthi  - Nursing: NST prior to surgery  - Diet: NPO  - IV fluids:  mL/hr  - Allergies: NKDA  - Labs: CBC, T&S, UDS  - GBS: pending.  Antibiotics not indicated.  - Ancef 2g prior to skin incision  - Patient consented for  section.  Reviewed risks and benefits to include injury to surrounding organs (bowel, bladder, ureters, blood vessels, nerves, baby), infection, bleeding (possibly requiring blood transfusion and/or hysterectomy), and maternal/fetal death.    - Della Redmond and I have discussed pain goals for this hospitalization after reviewing her current clinical condition, medical history and prior pain experiences.  The goal is to keep her pain level appropriate.  To help achieve this, schedule Tylenol and NSAIDS, +/- Duramorph or PCA.    This document has been electronically signed by Opal Dietz MD on 2020 09:38.

## 2020-06-19 LAB — GROUP B STREP, DNA: NEGATIVE

## 2020-06-26 ENCOUNTER — ROUTINE PRENATAL (OUTPATIENT)
Dept: OBSTETRICS AND GYNECOLOGY | Facility: CLINIC | Age: 25
End: 2020-06-26

## 2020-06-26 VITALS — BODY MASS INDEX: 37.64 KG/M2 | DIASTOLIC BLOOD PRESSURE: 68 MMHG | SYSTOLIC BLOOD PRESSURE: 120 MMHG | WEIGHT: 205.8 LBS

## 2020-06-26 DIAGNOSIS — O36.63X0 EXCESSIVE FETAL GROWTH AFFECTING MANAGEMENT OF PREGNANCY IN THIRD TRIMESTER, SINGLE OR UNSPECIFIED FETUS: ICD-10-CM

## 2020-06-26 DIAGNOSIS — Z3A.36 36 WEEKS GESTATION OF PREGNANCY: Primary | ICD-10-CM

## 2020-06-26 DIAGNOSIS — O09.93 SUPERVISION OF HIGH RISK PREGNANCY IN THIRD TRIMESTER: ICD-10-CM

## 2020-06-26 PROCEDURE — 0502F SUBSEQUENT PRENATAL CARE: CPT | Performed by: NURSE PRACTITIONER

## 2020-06-26 NOTE — PROGRESS NOTES
CC: Prenatal visit    Della Redmond is a 24 y.o.  at 36w3d.  Doing well.  Denies contractions, LOF, or VB.  Reports good FM.    /68   Wt 93.4 kg (205 lb 12.8 oz)   LMP 10/15/2019   BMI 37.64 kg/m²   SVE: Deferred  Fundal Height (cm): 38 cm  Fetal Heart Rate: 139     Reviewed OB US/BPP with patient and spouse: cephalic presentation, fundal placenta. ALEC 10.16cm, BPP . EFW 3475gm (0qp10qu) 92%tile, AC >97%tile.  EFW at 39 weeks: 4109 gm (9lb1oz). Normal appearance of the stomach, kidneys, and bladder noted.       Problems (from 19 to present)     Problem Noted Resolved    Unwanted fertility 2020 by Opal Dietz MD No    Overview Signed 2020 10:36 AM by Opal Dietz MD     BTL consents signed          Rubella non-immune status, antepartum 2020 by Opal Dietz MD No    Overview Signed 2020  8:04 AM by Opal Dietz MD     Rubella equivocal   Needs MMR PP         History of  delivery 2020 by Opal Dietz MD No    Overview Signed 2020  8:04 AM by Opal Dietz MD     Delivery at 36 weeks         History of macrosomia in infant in prior pregnancy, currently pregnant 2020 by Opal Dietz MD No    Overview Signed 2020  8:05 AM by Opal Dietz MD      9#8oz         Obesity in pregnancy, antepartum 2020 by Opal Dietz MD No    Overview Signed 2020 11:45 AM by Opal Dietz MD     Class I obesity, PG BMI 30.2  Early glucola WNL         History of pre-eclampsia in prior pregnancy, currently pregnant 2019 by Amy Lomax APRN No    Overview Addendum 2020  7:56 AM by Opal Dietz MD     1st pregnancy- 37 weeks, preE w/ severe features  Baseline 24h urine protein WNL         Supervision of high risk pregnancy in third trimester  "2019 by Amy Lomax APRN No    Overview Addendum 2020 12:50 PM by Hilda Correa APRN     O pos / Rubella equiv / GBS neg  Dating: LMP = 1TUS (11wk)  Genetics: Declined  Tdap:   Flu: Declines  Anatomy: WNL Female \" Daniel\"  1h Glucola: 115  H&H/Plts:  Lab Results   Component Value Date    HGB 11.0 (L) 2020    HCT 32.5 (L) 2020     2020   Breast vs Bottle/BTL (consents signed )         History of postpartum hemorrhage 2019 by Amy Lomax APRN No    Overview Addendum 2020  7:53 AM by Opal Dietz MD     Required blood transfusion x2   (1st pregnancy)- prolonged 3rd stage, D&C -> ex lap, B-Glez suture; ICU admission, received 5u pRBCs  Desires PLTCS and BTL; counseled         History of gestational diabetes in prior pregnancy, currently pregnant 2019 by Amy Lomax APRN No    Overview Signed 2020  8:02 AM by Opal Dietz MD     Early glucola WNL               A/P: Della Redmond is a 24 y.o.  at 36w3d. US indicates a female fetus at 92% tile today; BPP .   - PLTCS scheduled for .  - Reviewed labor precautions and FKCs.  - RTC in 1 week  - Reviewed COVID-19 visitation policy  - Reviewed COVID-19 precautions     Diagnosis Plan   1. 36 weeks gestation of pregnancy     2. Supervision of high risk pregnancy in third trimester     3. Excessive fetal growth affecting management of pregnancy in third trimester, single or unspecified fetus       EDWIGE Aggarwal  2020  12:51    "

## 2020-06-30 DIAGNOSIS — O26.849 UTERINE SIZE DATE DISCREPANCY PREGNANCY: ICD-10-CM

## 2020-07-02 ENCOUNTER — ROUTINE PRENATAL (OUTPATIENT)
Dept: OBSTETRICS AND GYNECOLOGY | Facility: CLINIC | Age: 25
End: 2020-07-02

## 2020-07-02 VITALS — SYSTOLIC BLOOD PRESSURE: 118 MMHG | WEIGHT: 205.8 LBS | BODY MASS INDEX: 37.64 KG/M2 | DIASTOLIC BLOOD PRESSURE: 64 MMHG

## 2020-07-02 DIAGNOSIS — O09.299 HISTORY OF GESTATIONAL DIABETES IN PRIOR PREGNANCY, CURRENTLY PREGNANT: ICD-10-CM

## 2020-07-02 DIAGNOSIS — Z86.32 HISTORY OF GESTATIONAL DIABETES IN PRIOR PREGNANCY, CURRENTLY PREGNANT: ICD-10-CM

## 2020-07-02 DIAGNOSIS — Z30.09 UNWANTED FERTILITY: ICD-10-CM

## 2020-07-02 DIAGNOSIS — Z87.59 HISTORY OF POSTPARTUM HEMORRHAGE: ICD-10-CM

## 2020-07-02 DIAGNOSIS — Z3A.37 37 WEEKS GESTATION OF PREGNANCY: ICD-10-CM

## 2020-07-02 DIAGNOSIS — Z87.51 HISTORY OF PRETERM DELIVERY: ICD-10-CM

## 2020-07-02 DIAGNOSIS — O09.299 HISTORY OF PRE-ECLAMPSIA IN PRIOR PREGNANCY, CURRENTLY PREGNANT: ICD-10-CM

## 2020-07-02 DIAGNOSIS — O99.210 OBESITY IN PREGNANCY, ANTEPARTUM: ICD-10-CM

## 2020-07-02 DIAGNOSIS — O09.299 HISTORY OF MACROSOMIA IN INFANT IN PRIOR PREGNANCY, CURRENTLY PREGNANT: ICD-10-CM

## 2020-07-02 DIAGNOSIS — O09.93 SUPERVISION OF HIGH RISK PREGNANCY IN THIRD TRIMESTER: Primary | ICD-10-CM

## 2020-07-02 DIAGNOSIS — Z28.39 RUBELLA NON-IMMUNE STATUS, ANTEPARTUM: ICD-10-CM

## 2020-07-02 DIAGNOSIS — O09.899 RUBELLA NON-IMMUNE STATUS, ANTEPARTUM: ICD-10-CM

## 2020-07-02 PROCEDURE — 0502F SUBSEQUENT PRENATAL CARE: CPT | Performed by: OBSTETRICS & GYNECOLOGY

## 2020-07-02 NOTE — PROGRESS NOTES
"CC: Prenatal visit    Della Redmond is a 24 y.o.  at 37w2d.  Doing well.  Denies contractions, LOF, or VB.  Reports good FM.    /64   Wt 93.4 kg (205 lb 12.8 oz)   LMP 10/15/2019   BMI 37.64 kg/m²   SVE: Declined  Fundal Height (cm): 40 cm  Fetal Heart Rate: 144     Problems (from 19 to present)     Problem Noted Resolved    Unwanted fertility 2020 by Opal Dietz MD No    Overview Signed 2020 10:36 AM by Opal Dietz MD     BTL consents signed          Rubella non-immune status, antepartum 2020 by Opal Dietz MD No    Overview Signed 2020  8:04 AM by Opal Dietz MD     Rubella equivocal   Needs MMR PP         History of  delivery 2020 by Opal Dietz MD No    Overview Signed 2020  8:04 AM by Opal Dietz MD     Delivery at 36 weeks         History of macrosomia in infant in prior pregnancy, currently pregnant 2020 by Opal Dietz MD No    Overview Signed 2020  8:05 AM by Opal Dietz MD      9#8oz         Obesity in pregnancy, antepartum 2020 by Opal Dietz MD No    Overview Signed 2020 11:45 AM by Opal Dietz MD     Class I obesity, PG BMI 30.2  Early glucola WNL         History of pre-eclampsia in prior pregnancy, currently pregnant 2019 by Amy Lomax APRN No    Overview Addendum 2020  7:56 AM by Opal Dietz MD     1st pregnancy- 37 weeks, preE w/ severe features  Baseline 24h urine protein WNL         Supervision of high risk pregnancy in third trimester 2019 by Amy Lomax APRN No    Overview Addendum 2020 12:50 PM by Hilda Correa APRN     O pos / Rubella equiv / GBS neg  Dating: LMP = 1TUS (11wk)  Genetics: Declined  Tdap:   Flu: Declines  Anatomy: WNL Female \" " "Daniel\"  1h Glucola: 115  H&H/Plts:  Lab Results   Component Value Date    HGB 11.0 (L) 2020    HCT 32.5 (L) 2020     2020   Breast vs Bottle/BTL (consents signed )         History of postpartum hemorrhage 2019 by Amy Lomax APRN No    Overview Addendum 2020  7:53 AM by Opal Ditez MD     Required blood transfusion x2   (1st pregnancy)- prolonged 3rd stage, D&C -> ex lap, B-Glez suture; ICU admission, received 5u pRBCs  Desires PLTCS and BTL; counseled         History of gestational diabetes in prior pregnancy, currently pregnant 2019 by Amy Lomax APRN No    Overview Signed 2020  8:02 AM by Opal Dietz MD     Early glucola WNL             A/P: Della Redmond is a 24 y.o.  at 37w2d.  - RTC in 1 week  - Reviewed COVID-19 visitation policy  - Reviewed COVID-19 precautions     Diagnosis Plan   1. Supervision of high risk pregnancy in third trimester     2. Unwanted fertility     3. Rubella non-immune status, antepartum     4. History of  delivery     5. History of macrosomia in infant in prior pregnancy, currently pregnant     6. Obesity in pregnancy, antepartum     7. History of pre-eclampsia in prior pregnancy, currently pregnant     8. History of postpartum hemorrhage     9. History of gestational diabetes in prior pregnancy, currently pregnant     10. 37 weeks gestation of pregnancy       Opal Dietz MD  2020  13:54  "

## 2020-07-06 ENCOUNTER — ROUTINE PRENATAL (OUTPATIENT)
Dept: OBSTETRICS AND GYNECOLOGY | Facility: CLINIC | Age: 25
End: 2020-07-06

## 2020-07-06 VITALS — SYSTOLIC BLOOD PRESSURE: 110 MMHG | WEIGHT: 205 LBS | DIASTOLIC BLOOD PRESSURE: 66 MMHG | BODY MASS INDEX: 37.49 KG/M2

## 2020-07-06 DIAGNOSIS — Z3A.37 37 WEEKS GESTATION OF PREGNANCY: ICD-10-CM

## 2020-07-06 DIAGNOSIS — Z86.32 HISTORY OF GESTATIONAL DIABETES IN PRIOR PREGNANCY, CURRENTLY PREGNANT: ICD-10-CM

## 2020-07-06 DIAGNOSIS — Z87.51 HISTORY OF PRETERM DELIVERY: ICD-10-CM

## 2020-07-06 DIAGNOSIS — O09.899 RUBELLA NON-IMMUNE STATUS, ANTEPARTUM: ICD-10-CM

## 2020-07-06 DIAGNOSIS — Z30.09 UNWANTED FERTILITY: ICD-10-CM

## 2020-07-06 DIAGNOSIS — O09.299 HISTORY OF GESTATIONAL DIABETES IN PRIOR PREGNANCY, CURRENTLY PREGNANT: ICD-10-CM

## 2020-07-06 DIAGNOSIS — O09.299 HISTORY OF PRE-ECLAMPSIA IN PRIOR PREGNANCY, CURRENTLY PREGNANT: ICD-10-CM

## 2020-07-06 DIAGNOSIS — Z28.39 RUBELLA NON-IMMUNE STATUS, ANTEPARTUM: ICD-10-CM

## 2020-07-06 DIAGNOSIS — O09.299 HISTORY OF MACROSOMIA IN INFANT IN PRIOR PREGNANCY, CURRENTLY PREGNANT: ICD-10-CM

## 2020-07-06 DIAGNOSIS — O99.210 OBESITY IN PREGNANCY, ANTEPARTUM: ICD-10-CM

## 2020-07-06 DIAGNOSIS — O09.93 SUPERVISION OF HIGH RISK PREGNANCY IN THIRD TRIMESTER: Primary | ICD-10-CM

## 2020-07-06 DIAGNOSIS — Z87.59 HISTORY OF POSTPARTUM HEMORRHAGE: ICD-10-CM

## 2020-07-06 PROCEDURE — 0502F SUBSEQUENT PRENATAL CARE: CPT | Performed by: OBSTETRICS & GYNECOLOGY

## 2020-07-06 NOTE — PROGRESS NOTES
"CC: Prenatal visit    Della Redmond is a 24 y.o.  at 37w6d.  Doing well.  Denies contractions, LOF, or VB.  Reports good FM.    /66   Wt 93 kg (205 lb)   LMP 10/15/2019   BMI 37.49 kg/m²   Fundal Height (cm): 39 cm  Fetal Heart Rate: 122     Problems (from 19 to present)     Problem Noted Resolved    Unwanted fertility 2020 by Opal Dietz MD No    Overview Signed 2020 10:36 AM by Opal Dietz MD     BTL consents signed          Rubella non-immune status, antepartum 2020 by Opal Dietz MD No    Overview Signed 2020  8:04 AM by Opal Dietz MD     Rubella equivocal   Needs MMR PP         History of  delivery 2020 by Opal Dietz MD No    Overview Signed 2020  8:04 AM by Opal Dietz MD     Delivery at 36 weeks         History of macrosomia in infant in prior pregnancy, currently pregnant 2020 by Opal Dietz MD No    Overview Signed 2020  8:05 AM by Opal Dietz MD      9#8oz         Obesity in pregnancy, antepartum 2020 by Opal Dietz MD No    Overview Signed 2020 11:45 AM by Opal Dietz MD     Class I obesity, PG BMI 30.2  Early glucola WNL         History of pre-eclampsia in prior pregnancy, currently pregnant 2019 by Amy Lomax APRN No    Overview Addendum 2020  7:56 AM by Opal Dietz MD     1st pregnancy- 37 weeks, preE w/ severe features  Baseline 24h urine protein WNL         Supervision of high risk pregnancy in third trimester 2019 by Amy Lomax APRN No    Overview Addendum 2020 12:50 PM by Krupinski, Hilda, APRN     O pos / Rubella equiv / GBS neg  Dating: LMP = 1TUS (11wk)  Genetics: Declined  Tdap:   Flu: Declines  Anatomy: WNL Female \" Daniel\"  1h Glucola: " 115  H&H/Plts:  Lab Results   Component Value Date    HGB 11.0 (L) 2020    HCT 32.5 (L) 2020     2020   Breast vs Bottle/BTL (consents signed )         History of postpartum hemorrhage 2019 by Amy Lomax APRN No    Overview Addendum 2020  7:53 AM by Opal Dietz MD     Required blood transfusion x2   (1st pregnancy)- prolonged 3rd stage, D&C -> ex lap, B-Glez suture; ICU admission, received 5u pRBCs  Desires PLTCS and BTL; counseled         History of gestational diabetes in prior pregnancy, currently pregnant 2019 by Amy Lomax APRN No    Overview Signed 2020  8:02 AM by Opal Dietz MD     Early glucola WNL             A/P: Della Redmond is a 24 y.o.  at 37w6d.  - EPLTCS and B/L salpingectomy scheduled for ; reiterates that she wants permanent sterilization and a primary  section after prior counseling  - Reviewed COVID-19 visitation policy  - Reviewed COVID-19 precautions     Diagnosis Plan   1. Supervision of high risk pregnancy in third trimester     2. Unwanted fertility     3. Rubella non-immune status, antepartum     4. History of  delivery     5. History of macrosomia in infant in prior pregnancy, currently pregnant     6. Obesity in pregnancy, antepartum     7. History of pre-eclampsia in prior pregnancy, currently pregnant     8. History of postpartum hemorrhage     9. History of gestational diabetes in prior pregnancy, currently pregnant     10. 37 weeks gestation of pregnancy       Opal Dietz MD  2020  14:58

## 2020-07-14 ENCOUNTER — ANESTHESIA (OUTPATIENT)
Dept: LABOR AND DELIVERY | Facility: HOSPITAL | Age: 25
End: 2020-07-14

## 2020-07-14 ENCOUNTER — ANESTHESIA EVENT (OUTPATIENT)
Dept: LABOR AND DELIVERY | Facility: HOSPITAL | Age: 25
End: 2020-07-14

## 2020-07-14 ENCOUNTER — HOSPITAL ENCOUNTER (INPATIENT)
Facility: HOSPITAL | Age: 25
LOS: 2 days | Discharge: HOME | End: 2020-07-16
Attending: OBSTETRICS & GYNECOLOGY | Admitting: OBSTETRICS & GYNECOLOGY

## 2020-07-14 DIAGNOSIS — O09.93 SUPERVISION OF HIGH RISK PREGNANCY IN THIRD TRIMESTER: ICD-10-CM

## 2020-07-14 PROBLEM — O34.219 PREVIOUS CESAREAN DELIVERY AFFECTING PREGNANCY: Status: ACTIVE | Noted: 2020-07-14

## 2020-07-14 LAB
ABO GROUP BLD: NORMAL
AMPHET+METHAMPHET UR QL: NEGATIVE
AMPHETAMINES UR QL: NEGATIVE
BARBITURATES UR QL SCN: NEGATIVE
BENZODIAZ UR QL SCN: NEGATIVE
BLD GP AB SCN SERPL QL: NEGATIVE
BUPRENORPHINE SERPL-MCNC: NEGATIVE NG/ML
CANNABINOIDS SERPL QL: NEGATIVE
COCAINE UR QL: NEGATIVE
DEPRECATED RDW RBC AUTO: 47 FL (ref 37–54)
ERYTHROCYTE [DISTWIDTH] IN BLOOD BY AUTOMATED COUNT: 15.2 % (ref 12.3–15.4)
HCT VFR BLD AUTO: 33.3 % (ref 34–46.6)
HGB BLD-MCNC: 11.3 G/DL (ref 12–15.9)
Lab: NORMAL
MCH RBC QN AUTO: 28.8 PG (ref 26.6–33)
MCHC RBC AUTO-ENTMCNC: 33.9 G/DL (ref 31.5–35.7)
MCV RBC AUTO: 84.7 FL (ref 79–97)
METHADONE UR QL SCN: NEGATIVE
OPIATES UR QL: NEGATIVE
OXYCODONE UR QL SCN: NEGATIVE
PCP UR QL SCN: NEGATIVE
PLATELET # BLD AUTO: 256 10*3/MM3 (ref 140–450)
PMV BLD AUTO: 11.2 FL (ref 6–12)
PROPOXYPH UR QL: NEGATIVE
RBC # BLD AUTO: 3.93 10*6/MM3 (ref 3.77–5.28)
RH BLD: POSITIVE
T&S EXPIRATION DATE: NORMAL
TRICYCLICS UR QL SCN: NEGATIVE
WBC # BLD AUTO: 8.36 10*3/MM3 (ref 3.4–10.8)

## 2020-07-14 PROCEDURE — 59510 CESAREAN DELIVERY: CPT | Performed by: OBSTETRICS & GYNECOLOGY

## 2020-07-14 PROCEDURE — 86850 RBC ANTIBODY SCREEN: CPT | Performed by: OBSTETRICS & GYNECOLOGY

## 2020-07-14 PROCEDURE — 85027 COMPLETE CBC AUTOMATED: CPT | Performed by: OBSTETRICS & GYNECOLOGY

## 2020-07-14 PROCEDURE — 58611 LIGATE OVIDUCT(S) ADD-ON: CPT | Performed by: OBSTETRICS & GYNECOLOGY

## 2020-07-14 PROCEDURE — 25010000002 KETOROLAC TROMETHAMINE PER 15 MG: Performed by: NURSE ANESTHETIST, CERTIFIED REGISTERED

## 2020-07-14 PROCEDURE — 25010000002 PHENYLEPHRINE PER 1 ML: Performed by: NURSE ANESTHETIST, CERTIFIED REGISTERED

## 2020-07-14 PROCEDURE — 25010000002 METHYLERGONOVINE MALEATE PER 0.2 MG: Performed by: OBSTETRICS & GYNECOLOGY

## 2020-07-14 PROCEDURE — 25010000002 KETOROLAC TROMETHAMINE PER 15 MG: Performed by: OBSTETRICS & GYNECOLOGY

## 2020-07-14 PROCEDURE — 25010000002 ONDANSETRON PER 1 MG

## 2020-07-14 PROCEDURE — 0UB70ZZ EXCISION OF BILATERAL FALLOPIAN TUBES, OPEN APPROACH: ICD-10-PCS | Performed by: OBSTETRICS & GYNECOLOGY

## 2020-07-14 PROCEDURE — 86901 BLOOD TYPING SEROLOGIC RH(D): CPT | Performed by: OBSTETRICS & GYNECOLOGY

## 2020-07-14 PROCEDURE — 25010000002 ONDANSETRON PER 1 MG: Performed by: NURSE ANESTHETIST, CERTIFIED REGISTERED

## 2020-07-14 PROCEDURE — 51703 INSERT BLADDER CATH COMPLEX: CPT

## 2020-07-14 PROCEDURE — 88302 TISSUE EXAM BY PATHOLOGIST: CPT

## 2020-07-14 PROCEDURE — 80306 DRUG TEST PRSMV INSTRMNT: CPT | Performed by: OBSTETRICS & GYNECOLOGY

## 2020-07-14 PROCEDURE — 86923 COMPATIBILITY TEST ELECTRIC: CPT

## 2020-07-14 PROCEDURE — 25010000002 MORPHINE PER 10 MG: Performed by: NURSE ANESTHETIST, CERTIFIED REGISTERED

## 2020-07-14 PROCEDURE — 86900 BLOOD TYPING SEROLOGIC ABO: CPT | Performed by: OBSTETRICS & GYNECOLOGY

## 2020-07-14 RX ORDER — HYDROCORTISONE 25 MG/G
CREAM TOPICAL 3 TIMES DAILY PRN
Status: DISCONTINUED | OUTPATIENT
Start: 2020-07-14 | End: 2020-07-16 | Stop reason: HOSPADM

## 2020-07-14 RX ORDER — LIDOCAINE HYDROCHLORIDE 10 MG/ML
5 INJECTION, SOLUTION EPIDURAL; INFILTRATION; INTRACAUDAL; PERINEURAL AS NEEDED
Status: DISCONTINUED | OUTPATIENT
Start: 2020-07-14 | End: 2020-07-14

## 2020-07-14 RX ORDER — SODIUM CHLORIDE, SODIUM LACTATE, POTASSIUM CHLORIDE, CALCIUM CHLORIDE 600; 310; 30; 20 MG/100ML; MG/100ML; MG/100ML; MG/100ML
INJECTION, SOLUTION INTRAVENOUS
Status: COMPLETED
Start: 2020-07-14 | End: 2020-07-14

## 2020-07-14 RX ORDER — OXYCODONE HYDROCHLORIDE 5 MG/1
10 TABLET ORAL EVERY 4 HOURS PRN
Status: DISCONTINUED | OUTPATIENT
Start: 2020-07-14 | End: 2020-07-16 | Stop reason: HOSPADM

## 2020-07-14 RX ORDER — OXYTOCIN/0.9 % SODIUM CHLORIDE 30/500 ML
PLASTIC BAG, INJECTION (ML) INTRAVENOUS CONTINUOUS PRN
Status: DISCONTINUED | OUTPATIENT
Start: 2020-07-14 | End: 2020-07-14 | Stop reason: SURG

## 2020-07-14 RX ORDER — ONDANSETRON 2 MG/ML
4 INJECTION INTRAMUSCULAR; INTRAVENOUS ONCE AS NEEDED
Status: DISCONTINUED | OUTPATIENT
Start: 2020-07-14 | End: 2020-07-15

## 2020-07-14 RX ORDER — SODIUM CHLORIDE 0.9 % (FLUSH) 0.9 %
3 SYRINGE (ML) INJECTION EVERY 12 HOURS SCHEDULED
Status: DISCONTINUED | OUTPATIENT
Start: 2020-07-14 | End: 2020-07-14

## 2020-07-14 RX ORDER — CALCIUM CARBONATE 200(500)MG
2 TABLET,CHEWABLE ORAL EVERY 6 HOURS PRN
Status: DISCONTINUED | OUTPATIENT
Start: 2020-07-14 | End: 2020-07-16 | Stop reason: HOSPADM

## 2020-07-14 RX ORDER — IBUPROFEN 800 MG/1
800 TABLET ORAL EVERY 8 HOURS SCHEDULED
Status: DISCONTINUED | OUTPATIENT
Start: 2020-07-14 | End: 2020-07-16 | Stop reason: HOSPADM

## 2020-07-14 RX ORDER — METHYLERGONOVINE MALEATE 0.2 MG/ML
200 INJECTION INTRAVENOUS ONCE AS NEEDED
Status: COMPLETED | OUTPATIENT
Start: 2020-07-14 | End: 2020-07-14

## 2020-07-14 RX ORDER — OXYTOCIN/0.9 % SODIUM CHLORIDE 30/500 ML
85 PLASTIC BAG, INJECTION (ML) INTRAVENOUS ONCE
Status: DISCONTINUED | OUTPATIENT
Start: 2020-07-14 | End: 2020-07-14 | Stop reason: HOSPADM

## 2020-07-14 RX ORDER — SODIUM CHLORIDE, SODIUM LACTATE, POTASSIUM CHLORIDE, CALCIUM CHLORIDE 600; 310; 30; 20 MG/100ML; MG/100ML; MG/100ML; MG/100ML
INJECTION, SOLUTION INTRAVENOUS CONTINUOUS PRN
Status: DISCONTINUED | OUTPATIENT
Start: 2020-07-14 | End: 2020-07-14 | Stop reason: SURG

## 2020-07-14 RX ORDER — OXYTOCIN/0.9 % SODIUM CHLORIDE 30/500 ML
85 PLASTIC BAG, INJECTION (ML) INTRAVENOUS ONCE
Status: DISCONTINUED | OUTPATIENT
Start: 2020-07-14 | End: 2020-07-16 | Stop reason: HOSPADM

## 2020-07-14 RX ORDER — OXYTOCIN/0.9 % SODIUM CHLORIDE 30/500 ML
650 PLASTIC BAG, INJECTION (ML) INTRAVENOUS ONCE
Status: DISCONTINUED | OUTPATIENT
Start: 2020-07-14 | End: 2020-07-14 | Stop reason: HOSPADM

## 2020-07-14 RX ORDER — METHYLERGONOVINE MALEATE 0.2 MG/ML
INJECTION INTRAVENOUS AS NEEDED
Status: DISCONTINUED | OUTPATIENT
Start: 2020-07-14 | End: 2020-07-16 | Stop reason: HOSPADM

## 2020-07-14 RX ORDER — ONDANSETRON 4 MG/1
4 TABLET, FILM COATED ORAL EVERY 6 HOURS PRN
Status: DISCONTINUED | OUTPATIENT
Start: 2020-07-14 | End: 2020-07-14 | Stop reason: HOSPADM

## 2020-07-14 RX ORDER — ONDANSETRON 2 MG/ML
4 INJECTION INTRAMUSCULAR; INTRAVENOUS EVERY 6 HOURS PRN
Status: DISCONTINUED | OUTPATIENT
Start: 2020-07-14 | End: 2020-07-16 | Stop reason: HOSPADM

## 2020-07-14 RX ORDER — NALOXONE HCL 0.4 MG/ML
0.04 VIAL (ML) INJECTION
Status: DISCONTINUED | OUTPATIENT
Start: 2020-07-14 | End: 2020-07-16 | Stop reason: HOSPADM

## 2020-07-14 RX ORDER — BUPIVACAINE HCL/0.9 % NACL/PF 0.1 %
2 PLASTIC BAG, INJECTION (ML) EPIDURAL ONCE
Status: COMPLETED | OUTPATIENT
Start: 2020-07-14 | End: 2020-07-14

## 2020-07-14 RX ORDER — LANOLIN 100 %
OINTMENT (GRAM) TOPICAL
Status: DISCONTINUED | OUTPATIENT
Start: 2020-07-14 | End: 2020-07-16 | Stop reason: HOSPADM

## 2020-07-14 RX ORDER — OXYTOCIN/0.9 % SODIUM CHLORIDE 30/500 ML
650 PLASTIC BAG, INJECTION (ML) INTRAVENOUS ONCE
Status: DISCONTINUED | OUTPATIENT
Start: 2020-07-14 | End: 2020-07-16 | Stop reason: HOSPADM

## 2020-07-14 RX ORDER — KETOROLAC TROMETHAMINE 30 MG/ML
INJECTION, SOLUTION INTRAMUSCULAR; INTRAVENOUS AS NEEDED
Status: DISCONTINUED | OUTPATIENT
Start: 2020-07-14 | End: 2020-07-14 | Stop reason: SURG

## 2020-07-14 RX ORDER — DOCUSATE SODIUM 100 MG/1
100 CAPSULE, LIQUID FILLED ORAL 2 TIMES DAILY PRN
Status: DISCONTINUED | OUTPATIENT
Start: 2020-07-14 | End: 2020-07-15

## 2020-07-14 RX ORDER — MORPHINE SULFATE 1 MG/ML
INJECTION, SOLUTION EPIDURAL; INTRATHECAL; INTRAVENOUS AS NEEDED
Status: DISCONTINUED | OUTPATIENT
Start: 2020-07-14 | End: 2020-07-14 | Stop reason: SURG

## 2020-07-14 RX ORDER — SIMETHICONE 80 MG
80 TABLET,CHEWABLE ORAL 4 TIMES DAILY
Status: DISCONTINUED | OUTPATIENT
Start: 2020-07-14 | End: 2020-07-16 | Stop reason: HOSPADM

## 2020-07-14 RX ORDER — KETOROLAC TROMETHAMINE 30 MG/ML
30 INJECTION, SOLUTION INTRAMUSCULAR; INTRAVENOUS EVERY 6 HOURS
Status: DISCONTINUED | OUTPATIENT
Start: 2020-07-14 | End: 2020-07-14

## 2020-07-14 RX ORDER — IBUPROFEN 800 MG/1
800 TABLET ORAL EVERY 8 HOURS SCHEDULED
Status: DISCONTINUED | OUTPATIENT
Start: 2020-07-15 | End: 2020-07-14

## 2020-07-14 RX ORDER — POLYETHYLENE GLYCOL 3350 17 G/17G
17 POWDER, FOR SOLUTION ORAL DAILY PRN
Status: DISCONTINUED | OUTPATIENT
Start: 2020-07-14 | End: 2020-07-16 | Stop reason: HOSPADM

## 2020-07-14 RX ORDER — PRENATAL VIT/IRON FUM/FOLIC AC 27MG-0.8MG
1 TABLET ORAL DAILY
Status: DISCONTINUED | OUTPATIENT
Start: 2020-07-14 | End: 2020-07-16 | Stop reason: HOSPADM

## 2020-07-14 RX ORDER — OXYCODONE HYDROCHLORIDE 5 MG/1
5 TABLET ORAL EVERY 4 HOURS PRN
Status: DISCONTINUED | OUTPATIENT
Start: 2020-07-14 | End: 2020-07-16 | Stop reason: HOSPADM

## 2020-07-14 RX ORDER — DIPHENHYDRAMINE HCL 25 MG
25 CAPSULE ORAL EVERY 4 HOURS PRN
Status: DISCONTINUED | OUTPATIENT
Start: 2020-07-14 | End: 2020-07-16 | Stop reason: HOSPADM

## 2020-07-14 RX ORDER — MISOPROSTOL 100 UG/1
TABLET ORAL AS NEEDED
Status: DISCONTINUED | OUTPATIENT
Start: 2020-07-14 | End: 2020-07-16 | Stop reason: HOSPADM

## 2020-07-14 RX ORDER — ONDANSETRON 2 MG/ML
INJECTION INTRAMUSCULAR; INTRAVENOUS AS NEEDED
Status: DISCONTINUED | OUTPATIENT
Start: 2020-07-14 | End: 2020-07-14 | Stop reason: SURG

## 2020-07-14 RX ORDER — ACETAMINOPHEN 325 MG/1
650 TABLET ORAL EVERY 4 HOURS PRN
Status: DISCONTINUED | OUTPATIENT
Start: 2020-07-14 | End: 2020-07-15

## 2020-07-14 RX ORDER — SODIUM CHLORIDE 0.9 % (FLUSH) 0.9 %
3-10 SYRINGE (ML) INJECTION AS NEEDED
Status: DISCONTINUED | OUTPATIENT
Start: 2020-07-14 | End: 2020-07-14

## 2020-07-14 RX ORDER — BUPIVACAINE HYDROCHLORIDE 7.5 MG/ML
INJECTION, SOLUTION EPIDURAL; RETROBULBAR
Status: COMPLETED | OUTPATIENT
Start: 2020-07-14 | End: 2020-07-14

## 2020-07-14 RX ORDER — ALUMINA, MAGNESIA, AND SIMETHICONE 2400; 2400; 240 MG/30ML; MG/30ML; MG/30ML
15 SUSPENSION ORAL EVERY 4 HOURS PRN
Status: DISCONTINUED | OUTPATIENT
Start: 2020-07-14 | End: 2020-07-16 | Stop reason: HOSPADM

## 2020-07-14 RX ORDER — TRISODIUM CITRATE DIHYDRATE AND CITRIC ACID MONOHYDRATE 500; 334 MG/5ML; MG/5ML
30 SOLUTION ORAL ONCE
Status: COMPLETED | OUTPATIENT
Start: 2020-07-14 | End: 2020-07-14

## 2020-07-14 RX ORDER — ONDANSETRON 2 MG/ML
4 INJECTION INTRAMUSCULAR; INTRAVENOUS EVERY 6 HOURS PRN
Status: DISCONTINUED | OUTPATIENT
Start: 2020-07-14 | End: 2020-07-14 | Stop reason: HOSPADM

## 2020-07-14 RX ORDER — ONDANSETRON 2 MG/ML
INJECTION INTRAMUSCULAR; INTRAVENOUS
Status: COMPLETED
Start: 2020-07-14 | End: 2020-07-14

## 2020-07-14 RX ORDER — OXYTOCIN/0.9 % SODIUM CHLORIDE 30/500 ML
PLASTIC BAG, INJECTION (ML) INTRAVENOUS
Status: COMPLETED
Start: 2020-07-14 | End: 2020-07-14

## 2020-07-14 RX ORDER — ONDANSETRON 4 MG/1
4 TABLET, FILM COATED ORAL EVERY 8 HOURS PRN
Status: DISCONTINUED | OUTPATIENT
Start: 2020-07-14 | End: 2020-07-16 | Stop reason: HOSPADM

## 2020-07-14 RX ORDER — MISOPROSTOL 200 UG/1
1000 TABLET ORAL ONCE
Status: COMPLETED | OUTPATIENT
Start: 2020-07-14 | End: 2020-07-14

## 2020-07-14 RX ADMIN — PHENYLEPHRINE HYDROCHLORIDE 100 MCG: 10 INJECTION INTRAVENOUS at 07:23

## 2020-07-14 RX ADMIN — SODIUM CHLORIDE, POTASSIUM CHLORIDE, SODIUM LACTATE AND CALCIUM CHLORIDE: 600; 310; 30; 20 INJECTION, SOLUTION INTRAVENOUS at 07:03

## 2020-07-14 RX ADMIN — SODIUM CITRATE AND CITRIC ACID MONOHYDRATE 30 ML: 500; 334 SOLUTION ORAL at 07:01

## 2020-07-14 RX ADMIN — IBUPROFEN 800 MG: 800 TABLET ORAL at 20:14

## 2020-07-14 RX ADMIN — PHENYLEPHRINE HYDROCHLORIDE 100 MCG: 10 INJECTION INTRAVENOUS at 07:50

## 2020-07-14 RX ADMIN — SODIUM CHLORIDE, POTASSIUM CHLORIDE, SODIUM LACTATE AND CALCIUM CHLORIDE 1000 ML: 600; 310; 30; 20 INJECTION, SOLUTION INTRAVENOUS at 05:27

## 2020-07-14 RX ADMIN — SIMETHICONE CHEW TAB 80 MG 80 MG: 80 TABLET ORAL at 18:13

## 2020-07-14 RX ADMIN — ONDANSETRON HYDROCHLORIDE 8 MG: 2 INJECTION INTRAMUSCULAR; INTRAVENOUS at 07:07

## 2020-07-14 RX ADMIN — Medication 2 G: at 07:19

## 2020-07-14 RX ADMIN — MISOPROSTOL 1000 MCG: 200 TABLET ORAL at 08:25

## 2020-07-14 RX ADMIN — METHYLERGONOVINE MALEATE 200 MCG: 0.2 INJECTION, SOLUTION INTRAMUSCULAR; INTRAVENOUS at 07:35

## 2020-07-14 RX ADMIN — ONDANSETRON 4 MG: 2 INJECTION INTRAMUSCULAR; INTRAVENOUS at 10:48

## 2020-07-14 RX ADMIN — KETOROLAC TROMETHAMINE 30 MG: 30 INJECTION, SOLUTION INTRAMUSCULAR at 08:18

## 2020-07-14 RX ADMIN — ACETAMINOPHEN 650 MG: 325 TABLET, FILM COATED ORAL at 12:08

## 2020-07-14 RX ADMIN — OXYTOCIN-SODIUM CHLORIDE 0.9% IV SOLN 30 UNIT/500ML 650 ML/HR: 30-0.9/5 SOLUTION at 07:33

## 2020-07-14 RX ADMIN — SIMETHICONE CHEW TAB 80 MG 80 MG: 80 TABLET ORAL at 12:07

## 2020-07-14 RX ADMIN — SODIUM CHLORIDE, POTASSIUM CHLORIDE, SODIUM LACTATE AND CALCIUM CHLORIDE: 600; 310; 30; 20 INJECTION, SOLUTION INTRAVENOUS at 08:01

## 2020-07-14 RX ADMIN — BUPIVACAINE HYDROCHLORIDE 1.8 ML: 7.5 INJECTION, SOLUTION EPIDURAL; RETROBULBAR at 07:12

## 2020-07-14 RX ADMIN — Medication 0.2 MG: at 07:12

## 2020-07-14 RX ADMIN — KETOROLAC TROMETHAMINE 30 MG: 30 INJECTION, SOLUTION INTRAMUSCULAR; INTRAVENOUS at 14:07

## 2020-07-14 NOTE — ANESTHESIA POSTPROCEDURE EVALUATION
Patient: Della Redmond    Procedure Summary     Date:  20 Room / Location:  [facility] LABOR DELIVERY 1  [facility] LABOR DELIVERY    Anesthesia Start:  706 Anesthesia Stop:      Procedure:   SECTION PRIMARY WITH SALPINGECTOMY (N/A Abdomen) Diagnosis:       Supervision of high risk pregnancy in third trimester      (Supervision of high risk pregnancy in third trimester [O09.93])    Surgeon:  Opal Dietz MD Provider:  Henok Whelan CRNA    Anesthesia Type:  spinal ASA Status:  3          Anesthesia Type: spinal    Vitals  No vitals data found for the desired time range.          Post Anesthesia Care and Evaluation    Patient location during evaluation: bedside  Patient participation: complete - patient participated  Level of consciousness: awake and alert  Pain score: 0  Pain management: adequate  Airway patency: patent  Anesthetic complications: No anesthetic complications  PONV Status: none  Cardiovascular status: acceptable and stable  Respiratory status: acceptable and spontaneous ventilation  Hydration status: acceptable  Post Neuraxial Block status: No signs or symptoms of PDPH  Comments: Motor and sensory function still returning to base line. Sensory dermatome level is currently T10

## 2020-07-14 NOTE — ANESTHESIA PROCEDURE NOTES
Spinal Block      Patient reassessed immediately prior to procedure    Patient location during procedure: OB  Start Time: 7/14/2020 7:08 AM  Stop Time: 7/14/2020 7:12 AM  Indication:at surgeon's request and post-op pain management  Performed By  CRNA: Henok Whelan CRNA  Preanesthetic Checklist  Completed: patient identified, site marked, surgical consent, pre-op evaluation, timeout performed, IV checked, risks and benefits discussed and monitors and equipment checked  Spinal Block Prep:  Patient Position:sitting  Sterile Tech:cap, gloves, mask and sterile barriers  Prep:Betadine  Patient Monitoring:blood pressure monitoring, continuous pulse oximetry and EKG  Spinal Block Procedure  Approach:midline  Guidance:landmark technique and palpation technique  Location:L3-L4  Needle Type:Pencan  Needle Gauge:24 G  Placement of Spinal needle event:cerebrospinal fluid aspirated  Paresthesia: no  Fluid Appearance:clear  Medications: bupivacaine PF (MARCAINE) 0.75 % injection, 1.8 mL  Med Administered at 7/14/2020 7:12 AM   Post Assessment  Patient Tolerance:patient tolerated the procedure well with no apparent complications  Complications no

## 2020-07-14 NOTE — PLAN OF CARE
Problem: Patient Care Overview  Goal: Plan of Care Review  Outcome: Ongoing (interventions implemented as appropriate)  Flowsheets (Taken 7/14/2020 1526)  Progress: improving  Plan of Care Reviewed With: patient  Outcome Summary: VSS, lochia light, stood at bedside, pain controlled, francois in place and patent

## 2020-07-14 NOTE — INTERVAL H&P NOTE
"H&P reviewed. The patient was examined and there are no changes to the H&P. No concerns.    /82 (BP Location: Right arm, Patient Position: Lying)   Pulse 100   Temp 98.1 °F (36.7 °C) (Oral)   Resp 18   Ht 157.5 cm (62\")   Wt 93 kg (205 lb)   LMP 10/15/2019   SpO2 98%   Breastfeeding Yes   BMI 37.49 kg/m²   Gen: NAD, AAO x3  FHT Cat 1 w/ irregular toco    A/P: Della Redmond is a 24 y.o.  at 39w0d presenting for EPLTCS and B/L salpingectomy.  History of PPH x2; 2u pRBCs on hold, uterotonics ready.  - Ancef 2g    This document has been electronically signed by Opal Dietz MD on 2020 06:56.  "

## 2020-07-14 NOTE — OP NOTE
[facility]  Operative Report    Name: Della Redmond  MRN: [id number]  Date: 2020  CSN: [id number]      Location: [facility] LABOR DELIVERY    Service: Obstetrics    Pre-op Diagnosis:   1.  IUP at 39w0d  2.  Desire for elective primary  section  3.  History of PPH x2, requiring exploratory laparotomy with B-Glez suture, uterotonics, and blood transfusion  4.  Class I obesity, PG BMI 30.2  5.  History of GDM  6.  History of preeclampsia  7.  Rubella non-immune  8.  History of  labor  9.  Completed family status    Post-op Diagnosis: Same, in addition to delivered    Surgeon: Opal Dietz MD    Assistant: PALOMA Foss MD PGY1    Staff:  Circulator: Ellie Garcia RN  Scrub Person: Yesy Mendez & ANDREA Nurse: Kristie Sampson RN  Assistant: Ella Barillas CSA    Anesthesia: Spinal w/ Duramorph    Anesthesia Staff:  CRNA: Henok Whelan CRNA  Student Nurse Anesthetist: Ella Rios SRNA    Operation: Elective primary low-transverse  section and bilateral salpingectomy    Drains: Ghosh catheter, draining clear yellow urine    Complications: None    Findings: Normal appearing abdomen.  Minimal scarring noted of the fascia.  Delivery of viable female  weighing 3544 grams with Apgars of 8 and 9.  Bilateral fallopian tubes and ovaries appeared normal.  Several omental adhesions noted to the fundus and posterior uterus (consistent with prior B-Glez suture); some were easily dissected and others were not dissected off the uterus.  These extended from the fundus all of the way down to the mid-uterus posteriorly.    Condition: Stable    Specimens/Disposition: Placenta and umbilical cord- discard; bilateral fallopian tubes- to pathology    Estimated Blood Loss: 700 mL  Quantitative Blood Loss: pending  IV Fluids: 1000 mL crystalloid  Urine Output: 200 mL    Indications: Della Redmond, 24 y.o.,  at 39w0d presenting for  elective  section with sterilization.  she has a history of PPH x2 with the first requiring exploratory laparotomy and both requiring blood transfusion.  She declined trial of labor.  She was extensively counseled that in no way does a  section prevent a hemorrhage or needing blood products; patient wished to proceed with elective  delivery.  She declined LARCs for contraception.    Description of Operation:  The patient was identified and the procedure verified as a  section delivery.  The patient was given spinal anesthesia.  Patient prepared and draped in normal sterile fashion in dorsal supine position with a leftward tilt.  A transverse skin incision was made with the scalpel and carried down through the subcutaneous tissue to the fascia using the Bovie.  Fascial incision was made with the Bovie and extended transversely with Fajardo scissors.  The superior aspect of the fascia was grasped with Kocher clamps and the rectus muscle was dissected off bluntly and sharply with Fajardo scissors.  The inferior aspect of the fascia was then grasped with Kocher clamps and the rectus muscle and pyramidalis were dissected off bluntly and then sharply with Fajardo scissors.  The rectus muscle was then  in the midline and the peritoneum was identified and entered bluntly.  The peritoneal incision was extended superiorly and inferiorly.  Bladder blade and retractor were inserted.  The bladder blade was adjusted.  A low transverse uterine incision was made with a scalpel and the uterine incision was extended with upward traction.  The amniotic sac was ruptured at time of hysterotomy.  Delivered from cephalic presentation was a live female  weighing 3544 grams with Apgar scores of 8 at one minute and 9 at five minutes.  Cord clamped and cut and infant with bulb suction were handed to awaiting NICU staff.  Cord blood was obtained and sent.    The placenta was removed intact and appeared  normal.  Uterus exteriorized and cleared of all clots and debris.  The uterus, tubes and ovaries appeared normal.  The uterine incision was closed with running locked sutures of 0-Vicryl.  IM Methergine given due to strong history of postpartum hemorrhage.    Some of the omental adhesions were dissected with cautery.  Others on the posterior uterus were thick and decision made to not dissect off the uterus.  Attention was then turned to the right fallopian tube which was followed out to the fimbria.  The Enseal was used to excise the fallopian tube.  The same procedure was done to the left fallopian tube, which was also completely excised.  Good hemostasis noted from bilateral mesosalpinx.    Posterior cul-de-sac was cleared.  In the mid-hysterotomy, there was an area of bleeding; hemostasis achieved with single figure-of-eight suture with 0-Vicryl.  Uterus was reinserted atraumatically.  Hemostasis was observed with additional cauterization.  Bilateral paracolic gutters cleared.  Inspection of the abdomen and pelvis prior to abdominal wall closure revealed no evidence of retained instruments or sponges.  The fascia was then reapproximated with running sutures of 0-Vicryl.  Subcutaneous layer closed with 2-0 plain gut.  The skin was reapproximated with 3-0 Monocryl in subcuticular fashion.  Prineo dressing applied.  1000mcg rectal Cytotec administered prophylactically.    Estimated blood loss was 700 mL.  Sponge, needle and instrument counts were correct x2.  There were no intraoperative complications and patient tolerated the procedure well.  The patient was escorted to her room in stable condition.    The patient received Ancef 2g for antibiotic prophylaxis prior to the start of the procedure.    This document has been electronically signed by Opal Dietz MD on July 14, 2020 08:17.

## 2020-07-14 NOTE — ANESTHESIA PREPROCEDURE EVALUATION
Anesthesia Evaluation     Patient summary reviewed and Nursing notes reviewed   NPO Solid Status: > 8 hours  NPO Liquid Status: > 8 hours           Airway   Mallampati: II  TM distance: >3 FB  Neck ROM: full  Possible difficult intubation  Dental - normal exam     Pulmonary - normal exam   Cardiovascular - normal exam    (+) hypertension,       Neuro/Psych  GI/Hepatic/Renal/Endo    (+)   diabetes mellitus gestational,     Musculoskeletal     Abdominal  - normal exam   Substance History      OB/GYN    (+) Pregnant, Preeclampsia, pregnancy induced hypertension        Other                        Anesthesia Plan    ASA 3     spinal       Anesthetic plan, all risks, benefits, and alternatives have been provided, discussed and informed consent has been obtained with: patient and spouse/significant other.

## 2020-07-14 NOTE — L&D DELIVERY NOTE
[facility]   Delivery Note    Delivery     Delivery: , Low Transverse     YOB: 2022    Time of Birth:  Gestational Age 7:32 AM   39w0d     Anesthesia: Spinal     Delivering clinician: Opal Dietz    Forceps?   No   Vacuum? No    Shoulder dystocia present: No        Delivery narrative:  See operative report    Infant    Findings: female  infant     Infant observations: Weight: 3544 g (7 lb 13 oz)   Length: 19  in  Observations/Comments:         Apgars: 8   @ 1 minute /    9   @ 5 minutes   Infant Name: Daniel     Placenta, Cord, and Fluid    Placenta delivered  Manual removal  at   2020  7:33 AM     Cord: 3 vessels  present.   Nuchal Cord?  yes; Number of nuchal loops present:  1    Cord blood obtained: Yes    Cord gases obtained:  No    Cord gas results: Venous:  No results found for: PHCVEN    Arterial:  No results found for: PHCART     Repair    Episiotomy: None     No    Lacerations: No   Estimated Blood Loss:           Complications  none    Disposition  Mother to Mother Baby/Postpartum  in stable condition currently.  Baby to NBN  in stable condition currently.    Opal Dietz MD  20  10:02

## 2020-07-15 LAB
HCT VFR BLD AUTO: 28.6 % (ref 34–46.6)
HGB BLD-MCNC: 9.8 G/DL (ref 12–15.9)

## 2020-07-15 PROCEDURE — 85018 HEMOGLOBIN: CPT | Performed by: OBSTETRICS & GYNECOLOGY

## 2020-07-15 PROCEDURE — 99024 POSTOP FOLLOW-UP VISIT: CPT | Performed by: OBSTETRICS & GYNECOLOGY

## 2020-07-15 PROCEDURE — 85014 HEMATOCRIT: CPT | Performed by: OBSTETRICS & GYNECOLOGY

## 2020-07-15 RX ORDER — ACETAMINOPHEN 500 MG
1000 TABLET ORAL EVERY 6 HOURS
Status: DISCONTINUED | OUTPATIENT
Start: 2020-07-15 | End: 2020-07-16 | Stop reason: HOSPADM

## 2020-07-15 RX ORDER — DOCUSATE SODIUM 100 MG/1
100 CAPSULE, LIQUID FILLED ORAL 2 TIMES DAILY
Status: DISCONTINUED | OUTPATIENT
Start: 2020-07-15 | End: 2020-07-16 | Stop reason: HOSPADM

## 2020-07-15 RX ADMIN — SIMETHICONE CHEW TAB 80 MG 80 MG: 80 TABLET ORAL at 13:33

## 2020-07-15 RX ADMIN — DOCUSATE SODIUM 100 MG: 100 CAPSULE, LIQUID FILLED ORAL at 13:47

## 2020-07-15 RX ADMIN — ACETAMINOPHEN 1000 MG: 500 TABLET ORAL at 19:52

## 2020-07-15 RX ADMIN — OXYCODONE HYDROCHLORIDE 5 MG: 5 TABLET ORAL at 19:52

## 2020-07-15 RX ADMIN — SIMETHICONE CHEW TAB 80 MG 80 MG: 80 TABLET ORAL at 21:49

## 2020-07-15 RX ADMIN — PRENATAL VIT W/ FE FUMARATE-FA TAB 27-0.8 MG 1 TABLET: 27-0.8 TAB at 09:00

## 2020-07-15 RX ADMIN — IBUPROFEN 800 MG: 800 TABLET ORAL at 06:18

## 2020-07-15 RX ADMIN — OXYCODONE HYDROCHLORIDE 5 MG: 5 TABLET ORAL at 15:29

## 2020-07-15 RX ADMIN — IBUPROFEN 800 MG: 800 TABLET ORAL at 13:33

## 2020-07-15 RX ADMIN — SIMETHICONE CHEW TAB 80 MG 80 MG: 80 TABLET ORAL at 09:00

## 2020-07-15 RX ADMIN — IBUPROFEN 800 MG: 800 TABLET ORAL at 21:49

## 2020-07-15 RX ADMIN — SIMETHICONE CHEW TAB 80 MG 80 MG: 80 TABLET ORAL at 18:35

## 2020-07-15 NOTE — MEDICAL STUDENT
"[facility]  Progress Note - Obstetrics    Name: Della Redmond  MRN: [id number]  Location: M760/1  Date: 7/15/2020  CSN: [id number]    POD #1 s/p PLTCS with B/L salpingectomy secondary to prior PPH    Della Redmond, 24 y.o., seen and examined.  No complaints.  Pain well controlled.  Tolerating diet.  No nausea or vomiting.  No headache, dizziness.  + flatus, No bowel movement, but bowel sounds are present.  Up and out of bed and ambulating.  Voiding without difficulty.  No Ghosh still in place..  Lochia minimal.  Breast feeding.    PHYSICAL EXAM  /59 (BP Location: Right arm, Patient Position: Sitting)   Pulse 96   Temp 98.1 °F (36.7 °C) (Oral)   Resp 18   Ht 157.5 cm (62\")   Wt 93 kg (205 lb)   LMP 10/15/2019   SpO2 97%   Breastfeeding Yes   BMI 37.49 kg/m²   General: No apparent distress.  Alert and cooperative.  Pleasant.  Heart: Regular rate and rhythm.  No murmurs, rubs, or gallops.  Lungs: Clear to auscultation bilaterally.  No wheezes, rales, or rhonchi.  Abdomen: Soft. +BS.  Dressing/Incision: Clean, dry, and intact.  No erythema or warmth.    Extremities: No pitting edema in lower extremities.  No calf tenderness.      Intake/Output Summary (Last 24 hours) at 7/15/2020 0700  Last data filed at 7/14/2020 2020  Gross per 24 hour   Intake 1000 ml   Output 2767 ml   Net -1767 ml     LABS  Hemoglobin & Hematocrit, Blood [CMW923] (Order [id number])   Order   Date: 7/14/2020 Department: [facility] Released By: Gia Mcelroy RN (auto-released) Authorizing: Opal Dietz MD   Reprint Order Requisition     Hemoglobin & Hematocrit, Blood (Order #[id number]) on 7/14/20       Contains abnormal data Hemoglobin & Hematocrit, Blood   Order: [id number]   Status:  Final result   Visible to patient:  No (Not Released)   Next appt:  None   Specimen Information: Blood        Component  Ref Range & Units 04:02   Hemoglobin  12.0 - 15.9 g/dL 9.8Low   " "  Hematocrit  34.0 - 46.6 % 28.6Low     Resulting Agency [facility] LAB         Specimen Collected: 07/15/20 04:02 Last Resulted: 07/15/20 04:27 Lab Flowsheet Order Details View Encounter Lab and Collection Details Routing Result History            Result Read / Acknowledged      Acknowledge result   No acknowledgement history exists for this order.   Other Results from 2020      Tissue Pathology Exam  In process 2020    Prepare RBC, 2 Units  Edited Result - FINAL     CBC (No Diff)  Final result 2020    Type & Screen  Edited Result - FINAL 2020    Urine Drug Screen - Final result 2020    PREVIOUS HISTORY  Final result 2020        Hgb: 11.3 -> 9.8    IMPRESSION  Della Redmond is a 24 y.o.  POD #1 s/p LTCS with salpingectomy secondary to prior PPH.   PLAN  1.  Following surgery  - Continue routine post op care  - Encourage OOB and ambulation  - Encourage incentive spirometry  - Diet: Pregnancy/breastfeeding  - DVT prophylaxis: Ambulation  - Breast feeding  - Rubella equivocal/ GBS -  - Discharge patient home, tomorrow, on POD2.  6 week follow-up for postpartum.    This document has been electronically signed by Anika Almonte, Medical Student on July 15, 2020 07:00.    Attending Attestation  As the teaching physician, I have personally re-performed the physical exam and medical decision making activities included in the student note.    Patient seen and examined.  Patient is doing very well.  Ambulating around the room.  Voiding without difficulty.  Passing flatus, denies BM.  Lochia minimal.  Reports that she is sore.  Breastfeeding w/ supplementation.    /71 (BP Location: Right arm)   Pulse 98   Temp 98.5 °F (36.9 °C) (Oral)   Resp 18   Ht 157.5 cm (62\")   Wt 93 kg (205 lb)   LMP 10/15/2019   SpO2 97%   Breastfeeding Yes   BMI 37.49 kg/m²   Gen: NAD, AAO x3  Abd: Soft, NTND, uterus nontender and FFBU, incision c/d/i    Hgb 11.3 > 9.8    A/P: Della Redmond is a 24 " y.o.  POD #1 s/p PLTCS and B/L salpingectomy.  Doing well and recovering appropriately.  - D/c to home tmrw likely    This document has been electronically signed by Opal Dietz MD on July 15, 2020 19:21.

## 2020-07-15 NOTE — PLAN OF CARE
Problem: Patient Care Overview  Goal: Plan of Care Review  Outcome: Ongoing (interventions implemented as appropriate)  Flowsheets  Taken 7/15/2020 1717  Progress: improving  Outcome Summary: VSS, up ambulating abreu, waiting to shower tonigt, taking pain meds as needed and scheduoed ones,  /ff firm and -1, sm to scant lochia, usig incentive, will f/u with Dr Dietz  Taken 7/15/2020 0900  Plan of Care Reviewed With: patient

## 2020-07-15 NOTE — NURSING NOTE
Pt attempting pumping through out the night. Little to no breast milk expressed. Patient opting to switch to bottle at this time. Supplementing not enough due to pt not expressing breast milk and infant not latching.

## 2020-07-15 NOTE — PLAN OF CARE
Problem: Patient Care Overview  Goal: Plan of Care Review  Outcome: Ongoing (interventions implemented as appropriate)  Flowsheets (Taken 7/14/2020 1526 by Gia Mcelroy RN)  Progress: improving  Plan of Care Reviewed With: patient  Outcome Summary: VSS, lochia light, stood at bedside, pain controlled, francois in place and patent     Problem: Breastfeeding (Adult,Obstetrics,Pediatric)  Intervention: Promote Breast Care and Comfort  Flowsheets (Taken 7/15/2020 0501)  Breast Pumping: other (see comments)  Note:   Pt attempting pumping through out the night. Little to no breast milk expressed. Patient opting to switch to bottle at this time. Supplementing not enough due to pt not expressing breast milk and infant not latching.

## 2020-07-16 VITALS
RESPIRATION RATE: 18 BRPM | HEIGHT: 62 IN | DIASTOLIC BLOOD PRESSURE: 74 MMHG | SYSTOLIC BLOOD PRESSURE: 125 MMHG | TEMPERATURE: 98.2 F | OXYGEN SATURATION: 98 % | BODY MASS INDEX: 37.73 KG/M2 | WEIGHT: 205 LBS | HEART RATE: 91 BPM

## 2020-07-16 PROCEDURE — 99024 POSTOP FOLLOW-UP VISIT: CPT | Performed by: OBSTETRICS & GYNECOLOGY

## 2020-07-16 RX ORDER — PSEUDOEPHEDRINE HCL 30 MG
100 TABLET ORAL 2 TIMES DAILY
Qty: 60 EACH | Refills: 1 | Status: SHIPPED | OUTPATIENT
Start: 2020-07-16 | End: 2020-08-31

## 2020-07-16 RX ORDER — ACETAMINOPHEN 500 MG
1000 TABLET ORAL EVERY 6 HOURS
Qty: 60 TABLET | Refills: 1 | Status: SHIPPED | OUTPATIENT
Start: 2020-07-16 | End: 2020-08-31

## 2020-07-16 RX ORDER — IBUPROFEN 800 MG/1
800 TABLET ORAL EVERY 8 HOURS SCHEDULED
Qty: 60 TABLET | Refills: 1 | Status: SHIPPED | OUTPATIENT
Start: 2020-07-16 | End: 2020-08-31

## 2020-07-16 RX ORDER — OXYCODONE HYDROCHLORIDE 5 MG/1
5 TABLET ORAL EVERY 4 HOURS PRN
Qty: 15 TABLET | Refills: 0 | Status: SHIPPED | OUTPATIENT
Start: 2020-07-16 | End: 2020-07-24

## 2020-07-16 RX ADMIN — PRENATAL VIT W/ FE FUMARATE-FA TAB 27-0.8 MG 1 TABLET: 27-0.8 TAB at 09:24

## 2020-07-16 RX ADMIN — DOCUSATE SODIUM 100 MG: 100 CAPSULE, LIQUID FILLED ORAL at 09:24

## 2020-07-16 RX ADMIN — ACETAMINOPHEN 1000 MG: 500 TABLET ORAL at 09:24

## 2020-07-16 RX ADMIN — IBUPROFEN 800 MG: 800 TABLET ORAL at 05:35

## 2020-07-16 RX ADMIN — SIMETHICONE CHEW TAB 80 MG 80 MG: 80 TABLET ORAL at 09:24

## 2020-07-16 RX ADMIN — ACETAMINOPHEN 1000 MG: 500 TABLET ORAL at 02:36

## 2020-07-16 NOTE — DISCHARGE SUMMARY
"[facility]  Discharge Summary  Patient Name: Della Redmond  : 1995  MRN: [id number]  CSN: [id number]    Discharge Summary    Date of Admission: 2020   Date of Discharge: 2020   Principle Discharge Dx: Active Hospital Problems    Diagnosis POA   • **Delivery by elective  section [O82] Yes   • Single liveborn infant, delivered by  [Z38.01] No   • Unwanted fertility [Z30.09] Yes     BTL consents signed      • Rubella non-immune status, antepartum [O99.89, Z28.3] Not Applicable     Rubella equivocal   Needs MMR PP     • History of  delivery [Z87.51] Not Applicable     Delivery at 36 weeks     • History of macrosomia in infant in prior pregnancy, currently pregnant [O09.299] Not Applicable      9#8oz     • Obesity in pregnancy, antepartum [O99.210] Yes     Class I obesity, PG BMI 30.2  Early glucola WNL     • Supervision of high risk pregnancy in third trimester [O09.93] Not Applicable     O pos / Rubella equiv / GBS neg  Dating: LMP = 1TUS (11wk)  Genetics: Declined  Tdap:   Flu: Declines  Anatomy: WNL Female \" Daniel\"  1h Glucola: 115  H&H/Plts:  Lab Results   Component Value Date    HGB 11.0 (L) 2020    HCT 32.5 (L) 2020     2020   Breast vs Bottle/BTL (consents signed )     • History of pre-eclampsia in prior pregnancy, currently pregnant [O09.299] Not Applicable     1st pregnancy- 37 weeks, preE w/ severe features  Baseline 24h urine protein WNL     • History of postpartum hemorrhage [Z86.2] Not Applicable     Required blood transfusion x2   (1st pregnancy)- prolonged 3rd stage, D&C -> ex lap, B-Glez suture; ICU admission, received 5u pRBCs  Desires PLTCS and BTL; counseled     • History of gestational diabetes in prior pregnancy, currently pregnant [O09.299, Z86.32] Not Applicable     Early glucola WNL        Procedures Performed: Procedure(s):   SECTION PRIMARY WITH SALPINGECTOMY   Brief History: " Patient is a 24 y.o. now  who presented to labor and delivery at 39w0d for elective primary  section and B/L salpingectomy.   Hospital Course: Patient presented at 39w0d for delivery.  She had a EPLTCS and B/L salpingectomy.  Her postoperative course was unremarkable.  On POD #2 she expressed the desire for discharge.  She had passed gas and was urinating normally.  She was eating a regular diet without difficulty.  She was ambulating well.  Her incision was clean, dry and intact.  Discharge instructions were given.  All questions were answered   Condition:  Discharge Activity:  Discharge Diet: Stable  Activity Instructions     Bathing Restrictions      Type of Restriction:  Bathing    Bathing Restrictions:  Other    Explain Bathing Restrictions:  No soaking in bathtub for 4 weeks/ Showers are fine.    Driving Restrictions      Type of Restriction:  Driving    Driving Restrictions:  No Driving (Time Limited)    Length:  Other    Indicate Length of Restriction:  No driving for 1 week or while on narcotic pain medications. You must be able to quickly press on the brake before driving. Riding is car is fine.    Lifting Restrictions      Type of Restriction:  Lifting    Lifting Restrictions:  Other    Explain Lifing Restrictions:  No lifting more than infant and baby carrier together for 6 weeks.    Pelvic Rest      Nothing in the vagina for 6 weeks to include tampons, douching, or sexual intercourse.    Sexual Activity Restrictions      Type of Restriction:  Sex    Explain Sexual Activity Restrictions:  No sexual intercourse, tampon use, or douching for at least 6 weeks        Regular   Discharge Medications:    Your medication list      START taking these medications      Instructions Last Dose Given Next Dose Due   acetaminophen 500 MG tablet  Commonly known as:  TYLENOL      Take 2 tablets by mouth Every 6 (Six) Hours.       docusate sodium 100 MG capsule      Take 100 mg by mouth 2 (Two) Times a  Day.       ibuprofen 800 MG tablet  Commonly known as:  ADVIL,MOTRIN      Take 1 tablet by mouth Every 8 (Eight) Hours.       oxyCODONE 5 MG immediate release tablet  Commonly known as:  ROXICODONE      Take 1 tablet by mouth Every 4 (Four) Hours As Needed for Severe Pain .          CONTINUE taking these medications      Instructions Last Dose Given Next Dose Due   prenatal (CLASSIC) vitamin  tablet  Generic drug:  prenatal vitamin      Take  by mouth Daily.             Where to Get Your Medications      These medications were sent to [facility] [place] [id number]    Hours:  Monday through Friday 7:00am to 5:00pm Phone:  [phone] ·   acetaminophen 500 MG tablet  · docusate sodium 100 MG capsule  · ibuprofen 800 MG tablet  · oxyCODONE 5 MG immediate release tablet        Discharge Disposition: Home   Follow-up: No future appointments.  1 week PP visit  6 week PP visit     This document has been electronically signed by Opal Dietz MD on July 16, 2020 06:47.

## 2020-07-16 NOTE — PROGRESS NOTES
[facility]  Progress Note  Hospital Day: 2  Subjective:  Postop day: 2 Days Post-Op  The patient feels well. Pain is well controlled with current medications. The baby is well. The patient is ambulating well. The patient is tolerating a normal diet. Flatus has been passed and had a bowel movement.  Lochia is improving.  Feeding method: both breast and formula feed.  Birth control plan: BTL   Meds Reviewed  ROS reviewed. All other ROS other than mentioned above are reported as negative.  Surgical history updated.  Prenatal Labs:  No components found for: RUBELLA  Lab Results   Component Value Date    ABORH O Rh Positive 2014       Objective:  Vitals:    07/15/20 1833 07/15/20 1949 20 0118 20 0534   BP: 117/71 124/76 107/62 117/70   BP Location: Right arm Left arm Left arm Left arm   Patient Position:  Sitting Lying Lying   Pulse: 98 105 102 101   Resp:    Temp: 98.5 °F (36.9 °C) 97.9 °F (36.6 °C) 98.1 °F (36.7 °C) 97.7 °F (36.5 °C)   TempSrc: Oral Oral Oral Oral   SpO2: 97% 98% 98% 99%   Weight:       Height:          General: alert, well appearing, in no apparent distress  CVS: RRR, S1/S2, no murmur  Lungs: clear to auscultation, no wheezes or rales and unlabored breathing  Bowel Sounds: active  Uterine Fundus: firm  Incision: healing well  Dressing: in place     Labs:  Lab Results   Component Value Date    WBC 8.36 2020    HGB 9.8 (L) 07/15/2020    HCT 28.6 (L) 07/15/2020    MCV 84.7 2020     2020    ABORH O Rh Positive 2014    RH Positive 2020    HEPBSAG Non-Reactive 2019     ?  Assessment:   Active Hospital Problems    Diagnosis  POA   • **Delivery by elective  section [O82]  Yes   • Single liveborn infant, delivered by  [Z38.01]  No   • Unwanted fertility [Z30.09]  Yes   • Rubella non-immune status, antepartum [O99.89, Z28.3]  Not Applicable   • History of  delivery [Z87.51]  Not  Applicable   • History of macrosomia in infant in prior pregnancy, currently pregnant [O09.299]  Not Applicable   • Obesity in pregnancy, antepartum [O99.210]  Yes   • Supervision of high risk pregnancy in third trimester [O09.93]  Not Applicable   • History of pre-eclampsia in prior pregnancy, currently pregnant [O09.299]  Not Applicable   • History of postpartum hemorrhage [Z86.2]  Not Applicable   • History of gestational diabetes in prior pregnancy, currently pregnant [O09.299, Z86.32]  Not Applicable      Resolved Hospital Problems   No resolved problems to display.       Plan:  24 y.o.  39w0d with Estimated Date of Delivery: 20 s/p , Low Transverse   of viable Female  POD 2. Motivated to go home today.     Doing well postoperatively.     Signature        Milan Curiel MD PGY-1  [facility]   This document has been electronically signed by Milan Curiel MD on 2020 06:13

## 2020-07-16 NOTE — PLAN OF CARE
Problem: Patient Care Overview  Goal: Plan of Care Review  Outcome: Ongoing (interventions implemented as appropriate)  Flowsheets (Taken 7/16/2020 0240)  Progress: improving  Plan of Care Reviewed With: patient; spouse  Outcome Summary: vss, fundus firm with bleeding light, pain well controlled with pain meds, pt walking  in room, passing flatus.

## 2020-07-16 NOTE — LACTATION NOTE
Infant has not latched in the hospital. Mother is pumping and has a pump for home use. Outpatient lactation appointment will be scheduled for next week to continue to work on latching. Mother verbalized understanding. No questions or concerns at this time.

## 2020-07-17 LAB
LAB AP CASE REPORT: NORMAL
PATH REPORT.FINAL DX SPEC: NORMAL

## 2020-07-19 LAB
BH BB BLOOD EXPIRATION DATE: NORMAL
BH BB BLOOD EXPIRATION DATE: NORMAL
BH BB BLOOD TYPE BARCODE: NORMAL
BH BB BLOOD TYPE BARCODE: NORMAL
BH BB DISPENSE STATUS: NORMAL
BH BB DISPENSE STATUS: NORMAL
BH BB PRODUCT CODE: NORMAL
BH BB PRODUCT CODE: NORMAL
BH BB UNIT NUMBER: NORMAL
BH BB UNIT NUMBER: NORMAL
CROSSMATCH INTERPRETATION: NORMAL
CROSSMATCH INTERPRETATION: NORMAL
UNIT  ABO: NORMAL
UNIT  ABO: NORMAL
UNIT  RH: NORMAL
UNIT  RH: NORMAL

## 2020-07-22 ENCOUNTER — HOSPITAL ENCOUNTER (OUTPATIENT)
Dept: LACTATION | Facility: HOSPITAL | Age: 25
Discharge: HOME | End: 2020-07-22

## 2020-07-22 NOTE — LACTATION NOTE
Assisted mother with latching today. Nipple shield used. Infant did latch briefly and transferred 25ml. Mother is going to attempt latching at home with nipple shield. Encouraged pumping after to keep up milk supply. Tips given to ease the latch on. Follow up appointment scheduled in two weeks.

## 2020-07-24 ENCOUNTER — POSTPARTUM VISIT (OUTPATIENT)
Dept: OBSTETRICS AND GYNECOLOGY | Facility: CLINIC | Age: 25
End: 2020-07-24

## 2020-07-24 VITALS
SYSTOLIC BLOOD PRESSURE: 124 MMHG | WEIGHT: 185 LBS | DIASTOLIC BLOOD PRESSURE: 82 MMHG | BODY MASS INDEX: 34.04 KG/M2 | HEIGHT: 62 IN

## 2020-07-24 PROCEDURE — 0503F POSTPARTUM CARE VISIT: CPT | Performed by: OBSTETRICS & GYNECOLOGY

## 2020-07-24 NOTE — PROGRESS NOTES
"Postpartum visit      Della Redmond is a 24 y.o.  s/p elective primary , Low Transverse and B/L salpingectomy on  at 39w0d who presents today for postpartum check.  The patient states she is doing very well.  Patient denies postpartum depression.  Menstrual cycles have not resumed.  Breastfeeding.  She has not resumed sexual intercourse.  Denies bowel or bladder issues.    PHYSICAL EXAM:    /82   Ht 157.5 cm (62\")   Wt 83.9 kg (185 lb)   LMP 10/15/2019   BMI 33.84 kg/m²   Abdomen: +BS, benign, no masses, soft, non-tender.  Incision: Well-healed, clean, dry, intact.  Extremities: No deep calf tenderness.  Postpartum Depression Screening Questionnaire: 0, no treatment indicated.    IMPRESSION/PLAN:  24 y.o.  s/p term , Low Transverse on .  Doing well.  - Recovered nicely from her delivery  - Contraception: B/L salpingectomy  - RTC 5 weeks for final PP visit    This document has been electronically signed by Opal Dietz MD on 2020 11:57.  "

## 2020-08-05 ENCOUNTER — APPOINTMENT (OUTPATIENT)
Dept: LACTATION | Facility: HOSPITAL | Age: 25
End: 2020-08-05

## 2020-08-31 ENCOUNTER — POSTPARTUM VISIT (OUTPATIENT)
Dept: OBSTETRICS AND GYNECOLOGY | Facility: CLINIC | Age: 25
End: 2020-08-31

## 2020-08-31 VITALS — DIASTOLIC BLOOD PRESSURE: 80 MMHG | SYSTOLIC BLOOD PRESSURE: 124 MMHG | BODY MASS INDEX: 32.67 KG/M2 | WEIGHT: 178.6 LBS

## 2020-08-31 PROBLEM — O09.299 HISTORY OF PRE-ECLAMPSIA IN PRIOR PREGNANCY, CURRENTLY PREGNANT: Status: RESOLVED | Noted: 2019-12-18 | Resolved: 2020-08-31

## 2020-08-31 PROBLEM — O09.93 SUPERVISION OF HIGH RISK PREGNANCY IN THIRD TRIMESTER: Status: RESOLVED | Noted: 2019-12-18 | Resolved: 2020-08-31

## 2020-08-31 PROBLEM — O09.899 RUBELLA NON-IMMUNE STATUS, ANTEPARTUM: Status: RESOLVED | Noted: 2020-01-23 | Resolved: 2020-08-31

## 2020-08-31 PROBLEM — Z87.51 HISTORY OF PRETERM DELIVERY: Status: RESOLVED | Noted: 2020-01-23 | Resolved: 2020-08-31

## 2020-08-31 PROBLEM — Z30.09 UNWANTED FERTILITY: Status: RESOLVED | Noted: 2020-06-04 | Resolved: 2020-08-31

## 2020-08-31 PROBLEM — Z86.32 HISTORY OF GESTATIONAL DIABETES IN PRIOR PREGNANCY, CURRENTLY PREGNANT: Status: RESOLVED | Noted: 2019-12-18 | Resolved: 2020-08-31

## 2020-08-31 PROBLEM — Z87.59 HISTORY OF POSTPARTUM HEMORRHAGE: Status: RESOLVED | Noted: 2019-12-18 | Resolved: 2020-08-31

## 2020-08-31 PROBLEM — O99.210 OBESITY IN PREGNANCY, ANTEPARTUM: Status: RESOLVED | Noted: 2020-01-23 | Resolved: 2020-08-31

## 2020-08-31 PROBLEM — Z28.39 RUBELLA NON-IMMUNE STATUS, ANTEPARTUM: Status: RESOLVED | Noted: 2020-01-23 | Resolved: 2020-08-31

## 2020-08-31 PROBLEM — O09.299 HISTORY OF GESTATIONAL DIABETES IN PRIOR PREGNANCY, CURRENTLY PREGNANT: Status: RESOLVED | Noted: 2019-12-18 | Resolved: 2020-08-31

## 2020-08-31 PROBLEM — O09.299 HISTORY OF MACROSOMIA IN INFANT IN PRIOR PREGNANCY, CURRENTLY PREGNANT: Status: RESOLVED | Noted: 2020-01-23 | Resolved: 2020-08-31

## 2020-08-31 PROCEDURE — 0503F POSTPARTUM CARE VISIT: CPT | Performed by: OBSTETRICS & GYNECOLOGY

## 2020-08-31 NOTE — PROGRESS NOTES
Postpartum visit      Della Redmond is a 24 y.o.  s/p elective primary , Low Transverse and B/L salpingectomy on  at 39w0d who presents today for postpartum check.  The patient states she is doing very well.  Patient denies postpartum depression.  Menstrual cycles have not resumed.  Breastfeeding.  She has not resumed sexual intercourse.  Denies bowel or bladder issues.    PHYSICAL EXAM:    /80 (BP Location: Left arm, Patient Position: Sitting, Cuff Size: Adult)   Wt 81 kg (178 lb 9.6 oz)   LMP 10/15/2019   BMI 32.67 kg/m²   Abdomen: +BS, benign, no masses, soft, non-tender.  Incision: Well-healed, clean, dry, intact.  Extremities: No deep calf tenderness.  Postpartum Depression Screening Questionnaire: 0, no treatment indicated.    IMPRESSION/PLAN:  24 y.o.  s/p term , Low Transverse on .  Doing well.  - Recovered nicely from her delivery  - Contraception: B/L salpingectomy  - RTC 1 year or sooner PRN  - Pap smear:     This document has been electronically signed by Opal Dietz MD on 2020 14:42.

## 2021-07-18 NOTE — PROGRESS NOTES
CC: Prenatal visit    Della Redmond is a 24 y.o.  at 31w3d.  Doing well.  Denies contractions, LOF, or VB.  Reports good FM.    /76   Wt 89.4 kg (197 lb)   LMP 10/15/2019   BMI 36.03 kg/m²   Fundal Height (cm): 32 cm  Fetal Heart Rate: 136     Problems (from 19 to present)     Problem Noted Resolved    Rubella non-immune status, antepartum 2020 by Opal Dietz MD No    Overview Signed 2020  8:04 AM by Opal Dietz MD     Rubella equivocal   Needs MMR PP         History of  delivery 2020 by Opal Dietz MD No    Overview Signed 2020  8:04 AM by Opal Dietz MD     Delivery at 36 weeks         History of macrosomia in infant in prior pregnancy, currently pregnant 2020 by Opal Dietz MD No    Overview Signed 2020  8:05 AM by Opal Dietz MD      9#8oz         Obesity in pregnancy, antepartum 2020 by Opal Dietz MD No    Overview Signed 2020 11:45 AM by Opal Dietz MD     Class I obesity, PG BMI 30.2  Early glucola WNL         History of pre-eclampsia in prior pregnancy, currently pregnant 2019 by Amy Lomax APRN No    Overview Addendum 2020  7:56 AM by Opal Dietz MD     1st pregnancy- 37 weeks, preE w/ severe features  Baseline 24h urine protein WNL         Supervision of high risk pregnancy in third trimester 2019 by Amy Lomax APRN No    Overview Addendum 2020  9:36 AM by Opal Dietz MD     O pos / Rubella equiv / GBS unk  Dating: LMP = 1TUS (11wk)  Genetics: Declined  Tdap:   Flu: Declines  Anatomy: WNL  1h Glucola: 115  H&H/Plts:  Lab Results   Component Value Date    HGB 11.0 (L) 2020    HCT 32.5 (L) 2020     2020   Breast vs Bottle/BTL (consents signed )         History of  postpartum hemorrhage 2019 by Amy Lomax APRN No    Overview Addendum 2020  7:53 AM by Opal Dietz MD     Required blood transfusion x2   (1st pregnancy)- prolonged 3rd stage, D&C -> ex lap, B-Glez suture; ICU admission, received 5u pRBCs  Desires PLTCS and BTL; counseled         History of gestational diabetes in prior pregnancy, currently pregnant 2019 by Amy Lomax APRN No    Overview Signed 2020  8:02 AM by Opal Dietz MD     Early glucola WNL             A/P: Della Redmond is a 24 y.o.  at 31w3d.  - RTC in 2 weeks  - GC/CT because it was not done earlier in the pregnancy  - Reviewed COVID-19 visitation policy  - Reviewed COVID-19 precautions     Diagnosis Plan   1. Supervision of high risk pregnancy in third trimester  Chlamydia trachomatis, Neisseria gonorrhoeae, Trichomonas vaginalis, PCR - Urine, Urine, Clean Catch   2. Rubella non-immune status, antepartum     3. History of  delivery     4. History of macrosomia in infant in prior pregnancy, currently pregnant     5. Obesity in pregnancy, antepartum     6. History of pre-eclampsia in prior pregnancy, currently pregnant     7. History of postpartum hemorrhage     8. History of gestational diabetes in prior pregnancy, currently pregnant     9. 31 weeks gestation of pregnancy       Opal Dietz MD  2020  09:37   show
